# Patient Record
Sex: FEMALE | Race: WHITE | NOT HISPANIC OR LATINO | Employment: OTHER | URBAN - METROPOLITAN AREA
[De-identification: names, ages, dates, MRNs, and addresses within clinical notes are randomized per-mention and may not be internally consistent; named-entity substitution may affect disease eponyms.]

---

## 2023-04-07 DIAGNOSIS — D50.8 OTHER IRON DEFICIENCY ANEMIA: ICD-10-CM

## 2023-04-07 DIAGNOSIS — E03.8 OTHER SPECIFIED HYPOTHYROIDISM: Primary | ICD-10-CM

## 2023-04-07 LAB — THYROTROPIN (MIU/L) IN SER/PLAS BY DETECTION LIMIT <= 0.05 MIU/L: 9.47 MIU/L (ref 0.44–3.98)

## 2023-04-08 RX ORDER — LEVOTHYROXINE SODIUM 75 UG/1
75 TABLET ORAL DAILY
Qty: 90 TABLET | Refills: 1 | Status: SHIPPED | OUTPATIENT
Start: 2023-04-08 | End: 2023-06-15 | Stop reason: ALTCHOICE

## 2023-04-08 RX ORDER — LEVOTHYROXINE SODIUM 50 UG/1
50 TABLET ORAL
COMMUNITY
Start: 2021-05-08 | End: 2023-04-08

## 2023-04-08 NOTE — PROGRESS NOTES
TSH continues to rise, almost 10  Does adjust medication to 75mcg daily   Current daily dose ~65mcg daily   Repeat labs in 6-8 weeks, along with CBC and iron studies at that time  Results and plan communicated to patient     Avery Hare MD

## 2023-05-12 DIAGNOSIS — F32.A DEPRESSION, UNSPECIFIED DEPRESSION TYPE: Primary | ICD-10-CM

## 2023-06-15 ENCOUNTER — OFFICE VISIT (OUTPATIENT)
Dept: PRIMARY CARE | Facility: CLINIC | Age: 83
End: 2023-06-15
Payer: MEDICARE

## 2023-06-15 VITALS
OXYGEN SATURATION: 95 % | DIASTOLIC BLOOD PRESSURE: 59 MMHG | BODY MASS INDEX: 29.81 KG/M2 | WEIGHT: 162 LBS | SYSTOLIC BLOOD PRESSURE: 94 MMHG | HEART RATE: 65 BPM | HEIGHT: 62 IN | RESPIRATION RATE: 16 BRPM

## 2023-06-15 DIAGNOSIS — M54.50 MIDLINE LOW BACK PAIN, UNSPECIFIED CHRONICITY, UNSPECIFIED WHETHER SCIATICA PRESENT: Primary | ICD-10-CM

## 2023-06-15 DIAGNOSIS — D50.9 IRON DEFICIENCY ANEMIA, UNSPECIFIED IRON DEFICIENCY ANEMIA TYPE: ICD-10-CM

## 2023-06-15 DIAGNOSIS — E03.8 OTHER SPECIFIED HYPOTHYROIDISM: ICD-10-CM

## 2023-06-15 PROCEDURE — 1157F ADVNC CARE PLAN IN RCRD: CPT | Performed by: STUDENT IN AN ORGANIZED HEALTH CARE EDUCATION/TRAINING PROGRAM

## 2023-06-15 PROCEDURE — 99214 OFFICE O/P EST MOD 30 MIN: CPT | Performed by: STUDENT IN AN ORGANIZED HEALTH CARE EDUCATION/TRAINING PROGRAM

## 2023-06-15 PROCEDURE — 1036F TOBACCO NON-USER: CPT | Performed by: STUDENT IN AN ORGANIZED HEALTH CARE EDUCATION/TRAINING PROGRAM

## 2023-06-15 PROCEDURE — 1159F MED LIST DOCD IN RCRD: CPT | Performed by: STUDENT IN AN ORGANIZED HEALTH CARE EDUCATION/TRAINING PROGRAM

## 2023-06-15 PROCEDURE — 1160F RVW MEDS BY RX/DR IN RCRD: CPT | Performed by: STUDENT IN AN ORGANIZED HEALTH CARE EDUCATION/TRAINING PROGRAM

## 2023-06-15 RX ORDER — CLOBETASOL PROPIONATE 0.5 MG/G
OINTMENT TOPICAL
COMMUNITY
Start: 2022-09-13 | End: 2023-10-20 | Stop reason: SDUPTHER

## 2023-06-15 RX ORDER — ACETAMINOPHEN 500 MG
TABLET ORAL
COMMUNITY
Start: 2015-04-06

## 2023-06-15 RX ORDER — TIZANIDINE 2 MG/1
2 TABLET ORAL EVERY 8 HOURS PRN
Qty: 30 TABLET | Refills: 0 | Status: SHIPPED | OUTPATIENT
Start: 2023-06-15 | End: 2023-07-14 | Stop reason: SDUPTHER

## 2023-06-15 RX ORDER — PAROXETINE HYDROCHLORIDE 20 MG/1
TABLET, FILM COATED ORAL
COMMUNITY
Start: 2018-12-03 | End: 2023-08-21 | Stop reason: SDUPTHER

## 2023-06-15 RX ORDER — CARBOXYMETHYLCELLULOSE SODIUM 5 MG/ML
SOLUTION/ DROPS OPHTHALMIC
COMMUNITY

## 2023-06-15 RX ORDER — CALCIUM CARBONATE 600 MG
1 TABLET ORAL DAILY
COMMUNITY

## 2023-06-15 RX ORDER — ACETAMINOPHEN 500 MG/1
1 CAPSULE, LIQUID FILLED ORAL 2 TIMES DAILY
COMMUNITY

## 2023-06-15 RX ORDER — LEVOTHYROXINE SODIUM 100 UG/1
100 TABLET ORAL
COMMUNITY
End: 2023-06-17 | Stop reason: ENTERED-IN-ERROR

## 2023-06-15 RX ORDER — FERROUS SULFATE 325(65) MG
TABLET ORAL
COMMUNITY
Start: 2022-11-04

## 2023-06-15 RX ORDER — METOPROLOL SUCCINATE 25 MG/1
TABLET, EXTENDED RELEASE ORAL
COMMUNITY
Start: 2018-11-07 | End: 2024-01-02

## 2023-06-15 RX ORDER — IPRATROPIUM BROMIDE 21 UG/1
SPRAY, METERED NASAL
COMMUNITY
End: 2024-03-19 | Stop reason: SDUPTHER

## 2023-06-15 RX ORDER — METOPROLOL SUCCINATE 50 MG/1
50 TABLET, EXTENDED RELEASE ORAL DAILY
COMMUNITY
Start: 2019-11-04

## 2023-06-15 RX ORDER — ATORVASTATIN CALCIUM 40 MG/1
1 TABLET, FILM COATED ORAL
COMMUNITY
Start: 2016-04-18 | End: 2024-01-02

## 2023-06-15 NOTE — PROGRESS NOTES
Brittany Suarez is a 82 y.o. female seen in Clinic at [facility] [place] by Dr. Avery Hare on 06/15/23 for routine care, as well as for management of the following chronic medical conditions: DLD, Tachy-Alan s/p PPM, HypoT. She presents today with acute concern of low back pain for last 4-6 weeks. No fall or injury, describes as sharp pain, lateral and not midline. No weakness or changes in bowel/bladder habits. Some relief with OTC pain medication. Discussed low dose muscle relaxant for additional benefit at this time. Prior cancer history, doing well, follows with pulmonary (Dr. Jimenez). Out of abundance of precaution and given duration of symptoms, plain films of L-spine to assess for any suspicious bony lesions, compression fracture, etc.     #Low Back Pain   - likely sciatic per description   - supportive care, trial low dose muscle relaxant  - plain films given lung adeno history  - continue to follow closely     CHRONIC MEDICAL CONDITIONS:  #DLD  - Atorva 40  - LDL 90 per labs 07/2022    #Tachy-Alan Syndrome s/p pacemaker, PAFib  - AC   - Metoprolol 50+25mg   - Xarelto 20mg QHS     #HypoT  - Levothyroxine 75mcg dosing currently   - last TSH 9.47 in 04/2023 on 65mcg daily dosing; adjusted to 75  [  ] repeat TSH today     #Depression   - Paroxetine 20mg daily     #HTN  - BP historically on lower side  - Metoprolol only medication as above (HR 65 in office today)    #Lung Cancer  - Lung adeno; diagnosed 07/2018 s/p LLL lobectomy in 09/2018  - Follows with Pulm, Dr. Jimenez; last imaging in January 2023 reassuring; follows annually  - Prior smoker     #Anemia  - Iron supplementation 4x/week  - Repeat CBC and iron studies today with improved indices     #Osteoporosis   - patient defers treatment at this time  - continue to f/u; consider Rheum referral as she is refusing bisphosphonate  - Vitamin D and Calcium supplementation recommended     Past Medical History: as above   Past Medical History:   Diagnosis Date    Acute  upper respiratory infection, unspecified 11/01/2016    Viral upper respiratory infection    Bitten or stung by nonvenomous insect and other nonvenomous arthropods, initial encounter 12/01/2014    Nonvenomous insect bite of multiple sites    Disease of stomach and duodenum, unspecified 09/21/2013    Gastric and duodenal disease    Encounter for gynecological examination (general) (routine) without abnormal findings 11/17/2017    Visit for routine gyn exam    Encounter for screening for malignant neoplasm of rectum     Screening for cancer of the rectum    Headache, unspecified 10/31/2016    Sinus headache    Low back pain, unspecified 03/03/2016    Acute low back pain    Other conditions influencing health status 02/18/2017    History of cough    Other specified noninflammatory disorders of vagina 11/17/2017    Vaginal irritation    Other specified symptoms and signs involving the circulatory and respiratory systems 02/02/2017    Chest congestion    Personal history of other diseases of the musculoskeletal system and connective tissue 11/13/2020    History of osteoporosis    Personal history of other diseases of the respiratory system 02/02/2017    History of upper respiratory infection    Personal history of other diseases of the respiratory system 04/03/2017    History of acute sinusitis    Personal history of other medical treatment 11/17/2017    History of screening mammography    Urinary tract infection, site not specified 12/01/2014    Acute UTI     Subspecialty Medical Care: Pulm, Cardiology (as needed)    Past Surgical History:  lung lobectomy; PPM  Past Surgical History:   Procedure Laterality Date    ANKLE SURGERY  02/17/2014    Ankle Surgery    APPENDECTOMY  06/18/2014    Appendectomy    CARDIAC PACEMAKER PLACEMENT  06/22/2015    Pacemaker Placement    CT GUIDED PERCUTANEOUS BIOPSY LUNG  8/13/2018    CT GUIDED PERCUTANEOUS BIOPSY LUNG 8/13/2018 [facility] AIB LEGACY    FOOT SURGERY  06/22/2015    Foot Repair     LUNG LOBECTOMY  12/11/2018    Lung Lobectomy    MOUTH SURGERY  06/22/2015    Oral Surgery Tooth Extraction    TONSILLECTOMY  06/22/2015    Tonsillectomy     Medications:  Current Outpatient Medications:     atorvastatin (Lipitor) 40 mg tablet, Take 1 tablet (40 mg) by mouth once daily., Disp: , Rfl:     cholecalciferol (Vitamin D-3) 50 mcg (2,000 unit) capsule, Take by mouth., Disp: , Rfl:     clobetasol (Temovate) 0.05 % ointment, Apply a small amount to the vulva and perineum once daily, Disp: , Rfl:     ferrous sulfate 325 (65 Fe) MG tablet, Take by mouth., Disp: , Rfl:     metoprolol succinate XL (Toprol-XL) 25 mg 24 hr tablet, , Disp: , Rfl:     metoprolol succinate XL (Toprol-XL) 50 mg 24 hr tablet, , Disp: , Rfl:     PARoxetine (Paxil) 20 mg tablet, , Disp: , Rfl:     rivaroxaban (Xarelto) 20 mg tablet, Take by mouth., Disp: , Rfl:     acetaminophen 500 mg capsule, Take 1 capsule (500 mg) by mouth twice a day., Disp: , Rfl:     calcium carbonate 600 mg calcium (1,500 mg) tablet, Take 1 tablet (600 mg) by mouth once daily., Disp: , Rfl:     carboxymethylcellulose (Refresh Plus) 0.5 % ophthalmic solution, Administer into affected eye(s)., Disp: , Rfl:     ipratropium (Atrovent) 21 mcg (0.03 %) nasal spray, Administer into affected nostril(s)., Disp: , Rfl:     levothyroxine (Synthroid, Levoxyl) 75 mcg tablet, Take 1 tablet (75 mcg) by mouth once daily in the morning. Take before meals., Disp: , Rfl:     tiZANidine (Zanaflex) 2 mg tablet, Take 1 tablet (2 mg) by mouth every 8 hours if needed for muscle spasms for up to 10 days., Disp: 30 tablet, Rfl: 0  Pharmacy: [facility]; [facility]; [phone]    Allergies:   Allergies   Allergen Reactions    Amiodarone Nausea Only     Immunizations: staying up to date with COVID booster recommended     Family History:   - Brother passed away from PE, CLL   - Sister with Leiomyosarcoma (s/p hysterectomy); sister with asthma   - Mother with AML (age 40)  - Father  "with CVA (age 57)  - Son with GERD; VATS/Decortication s/p PNA   No family history on file.    Social History:   Home/Living Situation/Falls/Safety Assessment: lives at home alone; feels safe at home   Education/Employment/Work/Vocational: retired from    Activities: volunteering prior to the pandemic; avid reader; walking   Drug Use: prior smoker (50 years; up to pack a day); quit 10 years ago   Diet: no dietary concerns   Depression/Anxiety: on SSRI   Sexuality/Contraception/Menstrual History:   Sleep: deep sleeper; feels she sleeps too much     Patient Information:  Health Insurance: has insurance   Transportation: [employer] POA/Guardian: Sister (Connie) [phone]  Contact Information: [phone]    Visit Vitals  BP 94/59 (BP Location: Left arm, Patient Position: Sitting)   Pulse 65   Resp 16   Ht 1.575 m (5' 2\")   Wt 73.5 kg (162 lb)   SpO2 95%   BMI 29.63 kg/m²   Smoking Status Former   BSA 1.79 m²      PHYSICAL EXAM:   General:  female in NAD, well appearing   HEENT: NCAT, MMM  CV: RRR in office today   PULM: CTAB with diminished sounds at L base in setting of prior LLL lobectomy   ABD: soft, NT, ND, no rebound/guarding  : no suprapubic or CVA tenderness  EXT: WWP, no edema  SKIN: no rashes noted  NEURO: A&Ox3, no gross motor or sensory deficits, ambulates without assistance; gait not significantly impacted by lower back pain; mild TTP of L spine in paraspinal region with some radiation of pain elicited  PSYCH: pleasant mood, appropriate affect     Assessment/Plan    Brittany Suarez is a 82 y.o. female seen in Clinic at [facility] [place] by Dr. Avery Hare on 06/15/23 for routine care, as well as for management of the following chronic medical conditions: DLD, Tachy-Alan s/p PPM, HypoT. She presents today with acute concern of low back pain for last 4-6 weeks. No fall or injury, describes as sharp pain, lateral and not midline. No weakness or changes in bowel/bladder habits. Some relief with " OTC pain medication. Discussed low dose muscle relaxant for additional benefit at this time. Prior cancer history, doing well, follows with pulmonary (Dr. Jimenez). Out of abundance of precaution and given duration of symptoms, plain films of L-spine to assess for any suspicious bony lesions, compression fracture, etc.     #Low Back Pain   - likely sciatic per description   - supportive care, trial low dose muscle relaxant  - plain films given lung adeno history  - continue to follow closely     CHRONIC MEDICAL CONDITIONS:  #DLD  - Atorva 40  - LDL 90 per labs 07/2022    #Tachy-Alan Syndrome s/p pacemaker, PAFib  - AC   - Metoprolol 50+25mg   - Xarelto 20mg QHS     #HypoT  - Levothyroxine 75mcg dosing currently   - last TSH 9.47 in 04/2023 on 65mcg daily dosing; adjusted to 75  [  ] repeat TSH today     #Depression   - Paroxetine 20mg daily     #HTN  - BP historically on lower side  - Metoprolol only medication as above (HR 65 in office today)    #Lung Cancer  - Lung adeno; diagnosed 07/2018 s/p LLL lobectomy in 09/2018  - Follows with Pulm, Dr. Jimenez; last imaging in January 2023 reassuring; follows annually  - Prior smoker     #Anemia  - Iron supplementation 4x/week  - Repeat CBC and iron studies today with improved indices     #Osteoporosis   - patient defers treatment at this time  - continue to f/u; consider Rheum referral as she is refusing bisphosphonate  - Vitamin D and Calcium supplementation recommended     #Health Maintenance    Cancer Screening  - Cervical Cancer Screening: last pap smear/HPV testing: further screening not recommended given age    - Mammography: July 2023 due, ordered today   - Colorectal Cancer Screening: defers; last 2020   - Lung Cancer: prior history, follows with pulm     Laboratory Screening  - Lipid Screen: on statin, last labs 07/2022  - A1C, glucose screen: non-obese; normal labs 07/2022  - STI, HIV, Hep B screen: defers  - Hep C screen: defers    Imaging Screening  -  Osteoporosis/DEXA screening: Jan 2023, patient defers treatment at this time     Immunizations:   - Influenza: 2022   - COVID: bivalent booster UTD   - Tdap: due 2027  - Prevnar, Pneumovax: UTD  - Shingrix: UTD     Other Screening  - Health Literacy Assessment: Appropriate   - Depression screen: NEGATIVE   - Home safety/partner violence screen: NEGATIVE   - Alcohol/tobacco/drug use screen: prior smoker   - Healthcare POA/Advanced Directives: Sister     Referrals: plain films, repeat labs     Patient Discussion:    Please call back the office with any questions at [phone]. In the case of an emergency, please call 911 or go to the nearest Emergency Department.     RTC in 5 months for MCW visit (due November 2023), sooner if acute issues arise.     Avery Hare MD  Internal Medicine-Pediatrics  [facility] [address], [address]  P: [phone], F: [phone]

## 2023-06-16 ENCOUNTER — LAB (OUTPATIENT)
Dept: LAB | Facility: LAB | Age: 83
End: 2023-06-16
Payer: MEDICARE

## 2023-06-16 DIAGNOSIS — E03.8 OTHER SPECIFIED HYPOTHYROIDISM: ICD-10-CM

## 2023-06-16 DIAGNOSIS — D50.8 OTHER IRON DEFICIENCY ANEMIA: ICD-10-CM

## 2023-06-16 LAB
BASOPHILS (10*3/UL) IN BLOOD BY AUTOMATED COUNT: 0.05 X10E9/L (ref 0–0.1)
BASOPHILS/100 LEUKOCYTES IN BLOOD BY AUTOMATED COUNT: 0.9 % (ref 0–2)
EOSINOPHILS (10*3/UL) IN BLOOD BY AUTOMATED COUNT: 0.08 X10E9/L (ref 0–0.4)
EOSINOPHILS/100 LEUKOCYTES IN BLOOD BY AUTOMATED COUNT: 1.5 % (ref 0–6)
ERYTHROCYTE DISTRIBUTION WIDTH (RATIO) BY AUTOMATED COUNT: 15 % (ref 11.5–14.5)
ERYTHROCYTE MEAN CORPUSCULAR HEMOGLOBIN CONCENTRATION (G/DL) BY AUTOMATED: 30.6 G/DL (ref 32–36)
ERYTHROCYTE MEAN CORPUSCULAR VOLUME (FL) BY AUTOMATED COUNT: 75 FL (ref 80–100)
ERYTHROCYTES (10*6/UL) IN BLOOD BY AUTOMATED COUNT: 5.4 X10E12/L (ref 4–5.2)
FERRITIN (UG/LL) IN SER/PLAS: 94 UG/L (ref 8–150)
HEMATOCRIT (%) IN BLOOD BY AUTOMATED COUNT: 40.5 % (ref 36–46)
HEMOGLOBIN (G/DL) IN BLOOD: 12.4 G/DL (ref 12–16)
IMMATURE GRANULOCYTES/100 LEUKOCYTES IN BLOOD BY AUTOMATED COUNT: 0.2 % (ref 0–0.9)
IRON (UG/DL) IN SER/PLAS: 110 UG/DL (ref 35–150)
IRON BINDING CAPACITY (UG/DL) IN SER/PLAS: 393 UG/DL (ref 240–445)
IRON SATURATION (%) IN SER/PLAS: 28 % (ref 25–45)
LEUKOCYTES (10*3/UL) IN BLOOD BY AUTOMATED COUNT: 5.3 X10E9/L (ref 4.4–11.3)
LYMPHOCYTES (10*3/UL) IN BLOOD BY AUTOMATED COUNT: 2.2 X10E9/L (ref 0.8–3)
LYMPHOCYTES/100 LEUKOCYTES IN BLOOD BY AUTOMATED COUNT: 41.7 % (ref 13–44)
MONOCYTES (10*3/UL) IN BLOOD BY AUTOMATED COUNT: 0.48 X10E9/L (ref 0.05–0.8)
MONOCYTES/100 LEUKOCYTES IN BLOOD BY AUTOMATED COUNT: 9.1 % (ref 2–10)
NEUTROPHILS (10*3/UL) IN BLOOD BY AUTOMATED COUNT: 2.45 X10E9/L (ref 1.6–5.5)
NEUTROPHILS/100 LEUKOCYTES IN BLOOD BY AUTOMATED COUNT: 46.6 % (ref 40–80)
NRBC (PER 100 WBCS) BY AUTOMATED COUNT: 0 /100 WBC (ref 0–0)
PLATELETS (10*3/UL) IN BLOOD AUTOMATED COUNT: 264 X10E9/L (ref 150–450)
THYROTROPIN (MIU/L) IN SER/PLAS BY DETECTION LIMIT <= 0.05 MIU/L: 4.58 MIU/L (ref 0.44–3.98)
THYROXINE (T4) FREE (NG/DL) IN SER/PLAS: 0.99 NG/DL (ref 0.78–1.48)

## 2023-06-16 PROCEDURE — 82728 ASSAY OF FERRITIN: CPT

## 2023-06-16 PROCEDURE — 84443 ASSAY THYROID STIM HORMONE: CPT

## 2023-06-16 PROCEDURE — 84439 ASSAY OF FREE THYROXINE: CPT

## 2023-06-16 PROCEDURE — 83550 IRON BINDING TEST: CPT

## 2023-06-16 PROCEDURE — 83540 ASSAY OF IRON: CPT

## 2023-06-16 PROCEDURE — 85025 COMPLETE CBC W/AUTO DIFF WBC: CPT

## 2023-06-16 PROCEDURE — 36415 COLL VENOUS BLD VENIPUNCTURE: CPT

## 2023-06-17 RX ORDER — LEVOTHYROXINE SODIUM 75 UG/1
75 TABLET ORAL
COMMUNITY
End: 2023-06-28 | Stop reason: SDUPTHER

## 2023-06-27 ENCOUNTER — TELEPHONE (OUTPATIENT)
Dept: PRIMARY CARE | Facility: CLINIC | Age: 83
End: 2023-06-27
Payer: MEDICARE

## 2023-06-28 DIAGNOSIS — E03.8 OTHER SPECIFIED HYPOTHYROIDISM: Primary | ICD-10-CM

## 2023-06-28 RX ORDER — LEVOTHYROXINE SODIUM 75 UG/1
75 TABLET ORAL
Qty: 90 TABLET | Refills: 3 | Status: SHIPPED | OUTPATIENT
Start: 2023-06-28 | End: 2024-04-19

## 2023-06-30 ENCOUNTER — LAB (OUTPATIENT)
Dept: LAB | Facility: LAB | Age: 83
End: 2023-06-30
Payer: MEDICARE

## 2023-06-30 ENCOUNTER — OFFICE VISIT (OUTPATIENT)
Dept: PRIMARY CARE | Facility: CLINIC | Age: 83
End: 2023-06-30
Payer: MEDICARE

## 2023-06-30 VITALS
WEIGHT: 157 LBS | RESPIRATION RATE: 17 BRPM | OXYGEN SATURATION: 94 % | SYSTOLIC BLOOD PRESSURE: 103 MMHG | HEIGHT: 62 IN | DIASTOLIC BLOOD PRESSURE: 53 MMHG | HEART RATE: 69 BPM | BODY MASS INDEX: 28.89 KG/M2

## 2023-06-30 DIAGNOSIS — M54.50 BILATERAL LOW BACK PAIN, UNSPECIFIED CHRONICITY, UNSPECIFIED WHETHER SCIATICA PRESENT: ICD-10-CM

## 2023-06-30 DIAGNOSIS — M54.50 BILATERAL LOW BACK PAIN, UNSPECIFIED CHRONICITY, UNSPECIFIED WHETHER SCIATICA PRESENT: Primary | ICD-10-CM

## 2023-06-30 LAB
C REACTIVE PROTEIN (MG/L) IN SER/PLAS: <0.1 MG/DL
SEDIMENTATION RATE, ERYTHROCYTE: 5 MM/H (ref 0–30)

## 2023-06-30 PROCEDURE — 85652 RBC SED RATE AUTOMATED: CPT

## 2023-06-30 PROCEDURE — 1157F ADVNC CARE PLAN IN RCRD: CPT | Performed by: STUDENT IN AN ORGANIZED HEALTH CARE EDUCATION/TRAINING PROGRAM

## 2023-06-30 PROCEDURE — 36415 COLL VENOUS BLD VENIPUNCTURE: CPT

## 2023-06-30 PROCEDURE — 1160F RVW MEDS BY RX/DR IN RCRD: CPT | Performed by: STUDENT IN AN ORGANIZED HEALTH CARE EDUCATION/TRAINING PROGRAM

## 2023-06-30 PROCEDURE — 1036F TOBACCO NON-USER: CPT | Performed by: STUDENT IN AN ORGANIZED HEALTH CARE EDUCATION/TRAINING PROGRAM

## 2023-06-30 PROCEDURE — 1159F MED LIST DOCD IN RCRD: CPT | Performed by: STUDENT IN AN ORGANIZED HEALTH CARE EDUCATION/TRAINING PROGRAM

## 2023-06-30 PROCEDURE — 99213 OFFICE O/P EST LOW 20 MIN: CPT | Performed by: STUDENT IN AN ORGANIZED HEALTH CARE EDUCATION/TRAINING PROGRAM

## 2023-06-30 PROCEDURE — 86140 C-REACTIVE PROTEIN: CPT

## 2023-06-30 RX ORDER — METHYLPREDNISOLONE 4 MG/1
TABLET ORAL
Qty: 21 TABLET | Refills: 0 | Status: SHIPPED | OUTPATIENT
Start: 2023-06-30 | End: 2023-07-07

## 2023-06-30 RX ORDER — METHYLPREDNISOLONE 4 MG/1
TABLET ORAL
Qty: 21 TABLET | Refills: 0 | Status: SHIPPED | OUTPATIENT
Start: 2023-06-30 | End: 2023-06-30 | Stop reason: SDUPTHER

## 2023-06-30 NOTE — PROGRESS NOTES
Brittany Suarez is a 82 y.o. female seen in Clinic at [facility] [place] by Dr. Avery Hare on 06/30/23 for routine care, as well as for management of the following chronic medical conditions: DLD, Tachy-Alan s/p PPM, HypoT. She presents today with acute concern of low back pain for last 6-8 weeks. No fall or injury. Recently seen, plain films done and reassuring without evidence of compression fracture. Some relief with muscle relaxer but persistent. Some neck/shoulder pain as well. No jaw claudication, visual symptoms, or headache. Differential diagnosis multifocal MSK pain vs. Inflammatory. Given duration of symptoms and distribution, will obtain inflammatory markers to assess for possible PMR. If normal, treat symptomatically with steroid burst and PT. If positive/elevated, further workup and treatment as clinically indicated.     #Low Back Pain   [  ] inflammatory markers  [  ] steroid burst  - supportive care, continue low dose muscle relaxant  - plain films given lung adeno history: reassuring   [  ] PT referral   - continue to follow closely     CHRONIC MEDICAL CONDITIONS:  #DLD  - Atorva 40  - LDL 90 per labs 07/2022    #Tachy-Alan Syndrome s/p pacemaker, PAFib  - AC   - Metoprolol 50+25mg   - Xarelto 20mg QHS     #HypoT  - Levothyroxine 75mcg dosing currently   - last TSH 4.5 in 06/2023     #Depression   - Paroxetine 20mg daily     #HTN  - BP historically on lower side  - Metoprolol only medication as above (HR 69 in office today)    #Lung Cancer  - Lung adeno; diagnosed 07/2018 s/p LLL lobectomy in 09/2018  - Follows with Pulm, Dr. Jimenez; last imaging in January 2023 reassuring; follows annually  - Prior smoker     #Anemia  - Iron supplementation 4x/week  - Repeat CBC and iron studies from June 2023 with improved indices     #Osteoporosis   - patient defers treatment at this time  - continue to f/u; consider Rheum referral in past as she is refusing bisphosphonate  - Vitamin D and Calcium supplementation  recommended     Past Medical History: as above   Past Medical History:   Diagnosis Date    Acute upper respiratory infection, unspecified 11/01/2016    Viral upper respiratory infection    Bitten or stung by nonvenomous insect and other nonvenomous arthropods, initial encounter 12/01/2014    Nonvenomous insect bite of multiple sites    Disease of stomach and duodenum, unspecified 09/21/2013    Gastric and duodenal disease    Encounter for gynecological examination (general) (routine) without abnormal findings 11/17/2017    Visit for routine gyn exam    Encounter for screening for malignant neoplasm of rectum     Screening for cancer of the rectum    Headache, unspecified 10/31/2016    Sinus headache    Low back pain, unspecified 03/03/2016    Acute low back pain    Other conditions influencing health status 02/18/2017    History of cough    Other specified noninflammatory disorders of vagina 11/17/2017    Vaginal irritation    Other specified symptoms and signs involving the circulatory and respiratory systems 02/02/2017    Chest congestion    Personal history of other diseases of the musculoskeletal system and connective tissue 11/13/2020    History of osteoporosis    Personal history of other diseases of the respiratory system 02/02/2017    History of upper respiratory infection    Personal history of other diseases of the respiratory system 04/03/2017    History of acute sinusitis    Personal history of other medical treatment 11/17/2017    History of screening mammography    Urinary tract infection, site not specified 12/01/2014    Acute UTI     Subspecialty Medical Care: Pulm, Cardiology (as needed)    Past Surgical History:  lung lobectomy; PPM  Past Surgical History:   Procedure Laterality Date    ANKLE SURGERY  02/17/2014    Ankle Surgery    APPENDECTOMY  06/18/2014    Appendectomy    CARDIAC PACEMAKER PLACEMENT  06/22/2015    Pacemaker Placement    CT GUIDED PERCUTANEOUS BIOPSY LUNG  8/13/2018    CT GUIDED  PERCUTANEOUS BIOPSY LUNG [place] [facility]    FOOT SURGERY  06/22/2015    Foot Repair    LUNG LOBECTOMY  12/11/2018    Lung Lobectomy    MOUTH SURGERY  06/22/2015    Oral Surgery Tooth Extraction    TONSILLECTOMY  06/22/2015    Tonsillectomy     Medications:  Current Outpatient Medications:     acetaminophen 500 mg capsule, Take 1 capsule (500 mg) by mouth twice a day., Disp: , Rfl:     atorvastatin (Lipitor) 40 mg tablet, Take 1 tablet (40 mg) by mouth once daily., Disp: , Rfl:     calcium carbonate 600 mg calcium (1,500 mg) tablet, Take 1 tablet (600 mg) by mouth once daily., Disp: , Rfl:     carboxymethylcellulose (Refresh Plus) 0.5 % ophthalmic solution, Administer into affected eye(s)., Disp: , Rfl:     cholecalciferol (Vitamin D-3) 50 mcg (2,000 unit) capsule, Take by mouth., Disp: , Rfl:     clobetasol (Temovate) 0.05 % ointment, Apply a small amount to the vulva and perineum once daily, Disp: , Rfl:     ferrous sulfate 325 (65 Fe) MG tablet, Take by mouth., Disp: , Rfl:     ipratropium (Atrovent) 21 mcg (0.03 %) nasal spray, Administer into affected nostril(s)., Disp: , Rfl:     levothyroxine (Synthroid, Levoxyl) 75 mcg tablet, Take 1 tablet (75 mcg) by mouth once daily in the morning. Take before meals., Disp: 90 tablet, Rfl: 3    methylPREDNISolone (Medrol Dospak) 4 mg tablets, Take as directed on package., Disp: 21 tablet, Rfl: 0    metoprolol succinate XL (Toprol-XL) 25 mg 24 hr tablet, , Disp: , Rfl:     metoprolol succinate XL (Toprol-XL) 50 mg 24 hr tablet, , Disp: , Rfl:     PARoxetine (Paxil) 20 mg tablet, , Disp: , Rfl:     rivaroxaban (Xarelto) 20 mg tablet, Take by mouth., Disp: , Rfl:     tiZANidine (Zanaflex) 2 mg tablet, Take 1 tablet (2 mg) by mouth every 8 hours if needed for muscle spasms for up to 10 days., Disp: 30 tablet, Rfl: 0  Pharmacy: [facility]; [facility]; [phone]    Allergies:   Allergies   Allergen Reactions    Amiodarone Nausea Only     Immunizations: staying  "up to date with COVID booster recommended     Family History:   - Brother passed away from PE, CLL   - Sister with Leiomyosarcoma (s/p hysterectomy); sister with asthma   - Mother with AML (age 40)  - Father with CVA (age 57)  - Son with GERD; VATS/Decortication s/p PNA   No family history on file.    Social History:   Home/Living Situation/Falls/Safety Assessment: lives at home alone; feels safe at home   Education/Employment/Work/Vocational: retired from    Activities: volunteering prior to the pandemic; avid reader; walking   Drug Use: prior smoker (50 years; up to pack a day); quit 10 years ago   Diet: no dietary concerns   Depression/Anxiety: on SSRI   Sexuality/Contraception/Menstrual History:   Sleep: deep sleeper; feels she sleeps too much     Patient Information:  Health Insurance: has insurance   Transportation: [employer] POA/Guardian: Sister Michael) [phone]  Contact Information: [phone]    Visit Vitals  /53   Pulse 69   Resp 17   Ht 1.575 m (5' 2\")   Wt 71.2 kg (157 lb)   SpO2 94%   BMI 28.72 kg/m²   Smoking Status Former   BSA 1.76 m²      PHYSICAL EXAM:   General:  female in NAD, well appearing   HEENT: NCAT, MMM  CV: RRR in office today   PULM: CTAB with diminished sounds at L base in setting of prior LLL lobectomy   ABD: soft, NT, ND, no rebound/guarding  : no suprapubic or CVA tenderness  EXT: WWP, no edema  SKIN: no rashes noted  NEURO: A&Ox3, no gross motor or sensory deficits, ambulates without assistance; gait not significantly impacted by lower back pain  PSYCH: pleasant mood, appropriate affect     Assessment/Plan    Brittany Suarez is a 82 y.o. female seen in Clinic at [facility] [place] by Dr. Avery Hare on 06/30/23 for routine care, as well as for management of the following chronic medical conditions: DLD, Tachy-Alan s/p PPM, HypoT. She presents today with acute concern of low back pain for last 6-8 weeks. No fall or injury. Recently seen, plain films done and " reassuring without evidence of compression fracture. Some relief with muscle relaxer but persistent. Some neck/shoulder pain as well. No jaw claudication, visual symptoms, or headache. Differential diagnosis multifocal MSK pain vs. Inflammatory. Given duration of symptoms and distribution, will obtain inflammatory markers to assess for possible PMR. If normal, treat symptomatically with steroid burst and PT. If positive/elevated, further workup and treatment as clinically indicated.     #Low Back Pain   [  ] inflammatory markers  [  ] steroid burst  - supportive care, continue low dose muscle relaxant  - plain films given lung adeno history: reassuring   [  ] PT referral   - continue to follow closely     CHRONIC MEDICAL CONDITIONS:  #DLD  - Atorva 40  - LDL 90 per labs 07/2022    #Tachy-Alan Syndrome s/p pacemaker, PAFib  - AC   - Metoprolol 50+25mg   - Xarelto 20mg QHS     #HypoT  - Levothyroxine 75mcg dosing currently   - last TSH 4.5 in 06/2023     #Depression   - Paroxetine 20mg daily     #HTN  - BP historically on lower side  - Metoprolol only medication as above (HR 69 in office today)    #Lung Cancer  - Lung adeno; diagnosed 07/2018 s/p LLL lobectomy in 09/2018  - Follows with Pulm, Dr. Jimenez; last imaging in January 2023 reassuring; follows annually  - Prior smoker     #Anemia  - Iron supplementation 4x/week  - Repeat CBC and iron studies from June 2023 with improved indices     #Osteoporosis   - patient defers treatment at this time  - continue to f/u; consider Rheum referral in past as she is refusing bisphosphonate  - Vitamin D and Calcium supplementation recommended     #Health Maintenance    Cancer Screening  - Cervical Cancer Screening: last pap smear/HPV testing: further screening not recommended given age    - Mammography: July 2023 due and previously ordered  - Colorectal Cancer Screening: defers; last 2020   - Lung Cancer: prior history, follows with pulm     Laboratory Screening  - Lipid Screen:  on statin, last labs 07/2022  - A1C, glucose screen: non-obese; normal labs 07/2022  - STI, HIV, Hep B screen: defers  - Hep C screen: defers    Imaging Screening  - Osteoporosis/DEXA screening: Jan 2023, patient defers treatment at this time     Immunizations:   - Influenza: 2022   - COVID: bivalent booster UTD   - Tdap: due 2027  - Prevnar, Pneumovax: UTD  - Shingrix: UTD     Other Screening  - Health Literacy Assessment: Appropriate   - Depression screen: NEGATIVE   - Home safety/partner violence screen: NEGATIVE   - Alcohol/tobacco/drug use screen: prior smoker   - Healthcare POA/Advanced Directives: Sister     Referrals: ESR, CRP     Patient Discussion:    Please call back the office with any questions at [phone]. In the case of an emergency, please call 911 or go to the nearest Emergency Department.     RTC in 5 months for MCW visit (due November 2023), sooner if acute issues arise.     Avery Hare MD  Internal Medicine-Pediatrics  [facility] [place] [address], [address]  P: [phone], F: [phone]

## 2023-06-30 NOTE — PATIENT INSTRUCTIONS
Take steroid medication as directed:   Typically 6 tablets on day one, 5 tablets on day 2, 4 tablets on day 3, 3 tablets on day 4, 2 tablets on day 5, and 1 tablet on day 6 (21 tablets in pack total)    Labs today in [address] (downstairs)    Physical therapy referral if needed    Recent x-rays of back looked good.    You can continue to use the Tiaznidine (muscle relaxant) as well for additional help.    Best,  Dr. Hare

## 2023-07-06 ENCOUNTER — TELEPHONE (OUTPATIENT)
Dept: PRIMARY CARE | Facility: CLINIC | Age: 83
End: 2023-07-06
Payer: MEDICARE

## 2023-07-14 DIAGNOSIS — M54.50 MIDLINE LOW BACK PAIN, UNSPECIFIED CHRONICITY, UNSPECIFIED WHETHER SCIATICA PRESENT: ICD-10-CM

## 2023-07-14 RX ORDER — TIZANIDINE 2 MG/1
2 TABLET ORAL EVERY 8 HOURS PRN
Qty: 30 TABLET | Refills: 0 | Status: SHIPPED | OUTPATIENT
Start: 2023-07-14 | End: 2024-01-10 | Stop reason: WASHOUT

## 2023-08-21 DIAGNOSIS — F32.A DEPRESSION, UNSPECIFIED DEPRESSION TYPE: Primary | ICD-10-CM

## 2023-08-21 RX ORDER — PAROXETINE HYDROCHLORIDE 20 MG/1
TABLET, FILM COATED ORAL
Qty: 90 TABLET | Refills: 2 | Status: SHIPPED | OUTPATIENT
Start: 2023-08-21 | End: 2024-01-10 | Stop reason: SDUPTHER

## 2023-08-30 RX ORDER — PAROXETINE HYDROCHLORIDE 20 MG/1
TABLET, FILM COATED ORAL
Qty: 90 TABLET | Refills: 3 | Status: SHIPPED | OUTPATIENT
Start: 2023-08-30

## 2023-09-21 PROBLEM — I48.92 ATRIAL FIBRILLATION AND FLUTTER (MULTI): Status: ACTIVE | Noted: 2023-05-22

## 2023-09-21 PROBLEM — H16.229 KERATOCONJUNCTIVITIS SICCA: Status: ACTIVE | Noted: 2023-05-16

## 2023-09-21 PROBLEM — Z95.0 PRESENCE OF CARDIAC PACEMAKER: Status: ACTIVE | Noted: 2023-09-21

## 2023-09-21 PROBLEM — H52.13 MYOPIA OF BOTH EYES: Status: ACTIVE | Noted: 2023-05-16

## 2023-09-21 PROBLEM — M79.671 BILATERAL LEG AND FOOT PAIN: Status: ACTIVE | Noted: 2023-09-21

## 2023-09-21 PROBLEM — D22.9 BENIGN MOLE: Status: ACTIVE | Noted: 2023-09-21

## 2023-09-21 PROBLEM — E78.5 HYPERLIPIDEMIA: Status: ACTIVE | Noted: 2023-09-21

## 2023-09-21 PROBLEM — M43.10 ACQUIRED SPONDYLOLISTHESIS: Status: ACTIVE | Noted: 2023-09-21

## 2023-09-21 PROBLEM — R53.83 FATIGUE: Status: ACTIVE | Noted: 2023-09-21

## 2023-09-21 PROBLEM — H52.10 MYOPIA WITH ASTIGMATISM AND PRESBYOPIA: Status: ACTIVE | Noted: 2023-09-21

## 2023-09-21 PROBLEM — J34.89 RHINORRHEA: Status: ACTIVE | Noted: 2023-09-21

## 2023-09-21 PROBLEM — H52.4 MYOPIA WITH ASTIGMATISM AND PRESBYOPIA: Status: ACTIVE | Noted: 2023-09-21

## 2023-09-21 PROBLEM — N95.2 VAGINAL ATROPHY: Status: ACTIVE | Noted: 2023-09-21

## 2023-09-21 PROBLEM — Z79.01 ON RIVAROXABAN THERAPY: Status: ACTIVE | Noted: 2023-09-21

## 2023-09-21 PROBLEM — I49.5 TACHYCARDIA-BRADYCARDIA SYNDROME (MULTI): Status: ACTIVE | Noted: 2023-09-21

## 2023-09-21 PROBLEM — R94.4 DECREASED GFR: Status: ACTIVE | Noted: 2023-09-21

## 2023-09-21 PROBLEM — D64.9 ANEMIA OF UNKNOWN ETIOLOGY: Status: ACTIVE | Noted: 2023-09-21

## 2023-09-21 PROBLEM — H52.209 MYOPIA WITH ASTIGMATISM AND PRESBYOPIA: Status: ACTIVE | Noted: 2023-09-21

## 2023-09-21 PROBLEM — M79.605 BILATERAL LEG AND FOOT PAIN: Status: ACTIVE | Noted: 2023-09-21

## 2023-09-21 PROBLEM — Z98.890 STATUS POST BUNIONECTOMY: Status: ACTIVE | Noted: 2021-07-19

## 2023-09-21 PROBLEM — R22.2 CHEST WALL MASS: Status: ACTIVE | Noted: 2023-09-21

## 2023-09-21 PROBLEM — H43.811 POSTERIOR VITREOUS DETACHMENT OF RIGHT EYE: Status: ACTIVE | Noted: 2023-05-16

## 2023-09-21 PROBLEM — M79.604 BILATERAL LEG AND FOOT PAIN: Status: ACTIVE | Noted: 2023-09-21

## 2023-09-21 PROBLEM — D36.7 BENIGN NEOPLASM OF UPPER EXTREMITY: Status: ACTIVE | Noted: 2019-01-24

## 2023-09-21 PROBLEM — D48.5 NEOPLASM OF UNCERTAIN BEHAVIOR OF SKIN: Status: ACTIVE | Noted: 2019-01-24

## 2023-09-21 PROBLEM — M35.01 KERATOCONJUNCTIVITIS SICCA (MULTI): Status: ACTIVE | Noted: 2023-05-16

## 2023-09-21 PROBLEM — L82.1 OTHER SEBORRHEIC KERATOSIS: Status: ACTIVE | Noted: 2019-01-24

## 2023-09-21 PROBLEM — H00.012 HORDEOLUM EXTERNUM OF RIGHT LOWER EYELID: Status: ACTIVE | Noted: 2023-09-21

## 2023-09-21 PROBLEM — F43.0 STRESS REACTION: Status: ACTIVE | Noted: 2023-09-21

## 2023-09-21 PROBLEM — I10 BENIGN ESSENTIAL HYPERTENSION: Status: ACTIVE | Noted: 2023-09-21

## 2023-09-21 PROBLEM — M79.672 BILATERAL LEG AND FOOT PAIN: Status: ACTIVE | Noted: 2023-09-21

## 2023-09-21 PROBLEM — D36.7 BENIGN NEOPLASM OF LOWER EXTREMITY: Status: ACTIVE | Noted: 2019-01-24

## 2023-09-21 PROBLEM — J31.0 CHRONIC RHINITIS: Status: ACTIVE | Noted: 2023-09-21

## 2023-09-21 PROBLEM — C34.90 LUNG CANCER (MULTI): Status: ACTIVE | Noted: 2023-09-21

## 2023-09-21 PROBLEM — R41.89 SUBJECTIVE MEMORY COMPLAINTS: Status: ACTIVE | Noted: 2023-09-21

## 2023-09-21 PROBLEM — R91.8 LUNG INFILTRATE: Status: ACTIVE | Noted: 2023-09-21

## 2023-09-21 PROBLEM — R68.2 DRY MOUTH: Status: ACTIVE | Noted: 2023-09-21

## 2023-09-21 PROBLEM — D18.01 HEMANGIOMA OF SKIN AND SUBCUTANEOUS TISSUE: Status: ACTIVE | Noted: 2019-01-24

## 2023-09-21 PROBLEM — E03.9 HYPOTHYROIDISM: Status: ACTIVE | Noted: 2023-09-21

## 2023-09-21 PROBLEM — I48.0 PAROXYSMAL ATRIAL FIBRILLATION (MULTI): Status: ACTIVE | Noted: 2023-09-21

## 2023-09-21 PROBLEM — L85.3 XEROSIS OF SKIN: Status: ACTIVE | Noted: 2023-09-21

## 2023-09-21 PROBLEM — M54.31 SCIATICA OF RIGHT SIDE: Status: ACTIVE | Noted: 2023-09-21

## 2023-09-21 PROBLEM — D36.7 BENIGN NEOPLASM OF FACE: Status: ACTIVE | Noted: 2019-01-24

## 2023-09-21 PROBLEM — H52.4 PRESBYOPIA OF BOTH EYES: Status: ACTIVE | Noted: 2023-05-16

## 2023-09-21 PROBLEM — K13.0 CHEILOSIS: Status: ACTIVE | Noted: 2023-09-21

## 2023-09-21 PROBLEM — G25.81 RESTLESS LEGS SYNDROME: Status: ACTIVE | Noted: 2023-09-21

## 2023-09-21 PROBLEM — D36.7 BENIGN NEOPLASM OF TRUNK: Status: ACTIVE | Noted: 2019-01-24

## 2023-09-21 PROBLEM — H02.889 MEIBOMIAN GLAND DYSFUNCTION: Status: ACTIVE | Noted: 2023-05-16

## 2023-09-21 PROBLEM — I48.91 ATRIAL FIBRILLATION AND FLUTTER (MULTI): Status: ACTIVE | Noted: 2023-05-22

## 2023-09-21 PROBLEM — L81.4 LENTIGINES: Status: ACTIVE | Noted: 2019-01-24

## 2023-09-21 PROBLEM — N90.89 VULVAR LESION: Status: ACTIVE | Noted: 2023-09-21

## 2023-09-21 PROBLEM — H52.203 ASTIGMATISM OF BOTH EYES: Status: ACTIVE | Noted: 2023-05-16

## 2023-09-21 PROBLEM — I44.2 COMPLETE ATRIOVENTRICULAR BLOCK (MULTI): Status: ACTIVE | Noted: 2023-09-21

## 2023-09-21 PROBLEM — R42 LIGHTHEADEDNESS: Status: ACTIVE | Noted: 2023-09-21

## 2023-09-21 PROBLEM — H04.129 DRY EYE SYNDROME: Status: ACTIVE | Noted: 2023-05-16

## 2023-09-21 PROBLEM — L91.8 OTHER HYPERTROPHIC DISORDERS OF THE SKIN: Status: ACTIVE | Noted: 2019-01-24

## 2023-09-21 PROBLEM — M85.851 OSTEOPENIA OF BOTH HIPS: Status: ACTIVE | Noted: 2023-09-21

## 2023-09-21 PROBLEM — H25.12 AGE-RELATED NUCLEAR CATARACT OF LEFT EYE: Status: ACTIVE | Noted: 2023-09-21

## 2023-09-21 PROBLEM — Z90.49 HX OF APPENDECTOMY: Status: ACTIVE | Noted: 2021-07-19

## 2023-09-21 PROBLEM — L82.1 SEBORRHEIC KERATOSIS: Status: ACTIVE | Noted: 2019-01-24

## 2023-09-21 PROBLEM — Z90.89 HX OF TONSILLECTOMY: Status: ACTIVE | Noted: 2021-07-19

## 2023-09-21 PROBLEM — H25.11 AGE-RELATED NUCLEAR CATARACT OF RIGHT EYE: Status: ACTIVE | Noted: 2023-09-21

## 2023-09-21 PROBLEM — L91.8 SKIN TAG: Status: ACTIVE | Noted: 2019-01-24

## 2023-09-21 PROBLEM — E78.00 HYPERCHOLESTEROLEMIA: Status: ACTIVE | Noted: 2023-09-21

## 2023-09-21 PROBLEM — L29.9 PRURITUS: Status: ACTIVE | Noted: 2023-09-21

## 2023-09-21 PROBLEM — M85.852 OSTEOPENIA OF BOTH HIPS: Status: ACTIVE | Noted: 2023-09-21

## 2023-09-21 PROBLEM — F32.A DEPRESSION: Status: ACTIVE | Noted: 2023-09-21

## 2023-09-21 PROBLEM — L73.8 OTHER SPECIFIED FOLLICULAR DISORDERS: Status: ACTIVE | Noted: 2019-01-24

## 2023-09-21 PROBLEM — D18.00 ANGIOMA: Status: ACTIVE | Noted: 2019-01-24

## 2023-09-21 PROBLEM — R09.81 NASAL CONGESTION: Status: ACTIVE | Noted: 2023-09-21

## 2023-09-21 PROBLEM — K21.9 GERD WITHOUT ESOPHAGITIS: Status: ACTIVE | Noted: 2023-09-21

## 2023-09-21 PROBLEM — L81.4 OTHER MELANIN HYPERPIGMENTATION: Status: ACTIVE | Noted: 2019-01-24

## 2023-09-21 PROBLEM — H25.9 AGE-RELATED CATARACT OF BOTH EYES: Status: ACTIVE | Noted: 2023-05-16

## 2023-09-21 PROBLEM — L98.9 FACIAL LESION: Status: ACTIVE | Noted: 2023-09-21

## 2023-09-21 PROBLEM — E55.9 VITAMIN D DEFICIENCY: Status: ACTIVE | Noted: 2023-09-21

## 2023-09-21 PROBLEM — L72.3 SEBACEOUS CYST: Status: ACTIVE | Noted: 2019-01-24

## 2023-09-21 PROBLEM — M54.2 NECK PAIN: Status: ACTIVE | Noted: 2023-09-21

## 2023-09-21 PROBLEM — L90.0 LICHEN SCLEROSUS: Status: ACTIVE | Noted: 2023-09-21

## 2023-09-21 PROBLEM — J30.2 CHRONIC SEASONAL ALLERGIC RHINITIS: Status: ACTIVE | Noted: 2023-09-21

## 2023-09-21 PROBLEM — U07.1 COVID-19 VIRUS INFECTION: Status: ACTIVE | Noted: 2023-09-21

## 2023-09-21 PROBLEM — R93.89 ABNORMAL CHEST CT: Status: ACTIVE | Noted: 2023-09-21

## 2023-09-21 PROBLEM — R91.1 LUNG NODULE: Status: ACTIVE | Noted: 2023-09-21

## 2023-09-21 RX ORDER — MUPIROCIN 20 MG/G
OINTMENT TOPICAL 2 TIMES DAILY
COMMUNITY
End: 2024-01-10 | Stop reason: ALTCHOICE

## 2023-09-21 RX ORDER — OLOPATADINE HYDROCHLORIDE 1 MG/ML
1 SOLUTION/ DROPS OPHTHALMIC DAILY
COMMUNITY

## 2023-10-06 ENCOUNTER — TREATMENT (OUTPATIENT)
Dept: PHYSICAL THERAPY | Facility: CLINIC | Age: 83
End: 2023-10-06
Payer: MEDICARE

## 2023-10-06 DIAGNOSIS — M54.50 ACUTE MIDLINE LOW BACK PAIN WITHOUT SCIATICA: Primary | ICD-10-CM

## 2023-10-06 PROCEDURE — 97110 THERAPEUTIC EXERCISES: CPT | Mod: GP

## 2023-10-06 NOTE — PROGRESS NOTES
Physical Therapy Orthopedic Examination Note    Patient Name: Brittany Suarez  MRN Number: [id number]  Initial Examination Date: 8/9/23  Referring Provider: Dr. Hare  Onset: 6/30/23    Today's Date: 10/6/2023  Time Calculation  Start Time: 0300  Stop Time: 0340  Time Calculation (min): 40 min    Insurance:  Visit Number: 4  Approved Visits: [place]  Insurance Auth Dates:  CMS Certification Period: 8/9/23 to 11/9/23    Precautions:  Fall Risk: low   pacemaker, takes blood thinner/xarelto, depression    PT Clinical Problem: low back pain and right scapular pain    Problem List  1. Acute midline low back pain without sciatica            Subjective: feeling good, shoulder blade area is better but still tight    Objective:    Ortho   8/9/23  Lumbar x ray L4/L5 grade 1 spondylolithesis  seated rot 35 each stiff  seated thoracic ext 25  seated flexion 40   Sidebend 75% each  standing flexion -6 inch to floor   right shoulder elevation 150 stiff   deltoid 5/5 no pain  tender right trap     Treatment:  Scapular retractions 10x   Seated tubing rows 10x   OTD pulleys elevation bilateral shoulders  OTD pulleys 90/90 scap retractions 10x  seated thoracic and neck extension /flexion guided movements  seated one arm reach thoracic side bend L/R  seated bilateral arm propeller joseph torso stretch rotations   seated moist heat right shoulder blade  vibratory massager to righ scap boarder  Assessment:       Plan  PT Plan: Skilled PT  PT Frequency: 1 time per week  Duration: 4-8 weeks  Onset Date: 06/30/23  Certification Period Start Date: 08/09/23  Certification Period End Date: 11/09/23  Rehab Potential: Good  Plan of Care Agreement: Patient  Planned interventions include: home program, manual therapy and therapeutic exercises . thoracic mobility, seated ball rolls, postural exercise, torso twists, side bending, OTD pulleys shoulder mobility in upright position with wedge on back to chair to encourage spinal extension, hep, manual  "prn, back to wall posture, etc,.         Home Program/Education:  Access Code: [email or web address]  URL: [email or web address]  Date: 10/06/2023  Prepared by: Cesar Stroud    Exercises  - Seated Scapular Retraction  - 2 x daily - 10 reps - 5 hold  - Seated Low Row with Tubing  - 5 x weekly - 10 reps - 5 hold  - Seated Sidebending  - 2 x daily - 10 reps  - Seated Thoracic Flexion and Extension AROM Hands Behind the Head  - 2 x daily - 10 reps  - Seated Trunk Rotation - Arms Crossed  - 2 x daily - 10 reps        Care Plan/Goals:  Encounter Problems       Encounter Problems (Active)       PT Problem       PT Goal 1       Start:  10/06/23    Expected End:  11/09/23       1. The Global Rating of Change Score (GROC) will improve to 5/7 = \"a good deal better\"  2. Improve functional outcome tool score (FOTS) by > 10 points for Minimally Important Clinical Difference (MICD).  3. Patient/client will be independent with a HEP and self-management skills to further enhance recovery and progress.  4. Patient/client will verbalize >75% symptom reduction for frequency and severity of symptoms and/or > two point reduction of VAS/NPRS.  5. The Patient Specific Functional Scale metric (PSFS) will improve from baseline value to greater than 80%              "

## 2023-10-11 ENCOUNTER — OFFICE VISIT (OUTPATIENT)
Dept: PRIMARY CARE | Facility: CLINIC | Age: 83
End: 2023-10-11
Payer: MEDICARE

## 2023-10-11 VITALS
HEIGHT: 62 IN | BODY MASS INDEX: 29.26 KG/M2 | OXYGEN SATURATION: 95 % | DIASTOLIC BLOOD PRESSURE: 61 MMHG | SYSTOLIC BLOOD PRESSURE: 91 MMHG | WEIGHT: 159 LBS | HEART RATE: 79 BPM

## 2023-10-11 DIAGNOSIS — E03.8 OTHER SPECIFIED HYPOTHYROIDISM: Primary | ICD-10-CM

## 2023-10-11 DIAGNOSIS — B35.1 ONYCHOMYCOSIS: ICD-10-CM

## 2023-10-11 DIAGNOSIS — Z12.31 ENCOUNTER FOR SCREENING MAMMOGRAM FOR MALIGNANT NEOPLASM OF BREAST: ICD-10-CM

## 2023-10-11 DIAGNOSIS — D50.9 IRON DEFICIENCY ANEMIA, UNSPECIFIED IRON DEFICIENCY ANEMIA TYPE: ICD-10-CM

## 2023-10-11 DIAGNOSIS — M54.50 LUMBAR BACK PAIN: ICD-10-CM

## 2023-10-11 PROCEDURE — 36415 COLL VENOUS BLD VENIPUNCTURE: CPT

## 2023-10-11 PROCEDURE — 1160F RVW MEDS BY RX/DR IN RCRD: CPT | Performed by: STUDENT IN AN ORGANIZED HEALTH CARE EDUCATION/TRAINING PROGRAM

## 2023-10-11 PROCEDURE — 83550 IRON BINDING TEST: CPT

## 2023-10-11 PROCEDURE — 84443 ASSAY THYROID STIM HORMONE: CPT

## 2023-10-11 PROCEDURE — 83020 HEMOGLOBIN ELECTROPHORESIS: CPT | Performed by: STUDENT IN AN ORGANIZED HEALTH CARE EDUCATION/TRAINING PROGRAM

## 2023-10-11 PROCEDURE — 1036F TOBACCO NON-USER: CPT | Performed by: STUDENT IN AN ORGANIZED HEALTH CARE EDUCATION/TRAINING PROGRAM

## 2023-10-11 PROCEDURE — 85027 COMPLETE CBC AUTOMATED: CPT

## 2023-10-11 PROCEDURE — 83021 HEMOGLOBIN CHROMOTOGRAPHY: CPT

## 2023-10-11 PROCEDURE — 82728 ASSAY OF FERRITIN: CPT

## 2023-10-11 PROCEDURE — 99213 OFFICE O/P EST LOW 20 MIN: CPT | Performed by: STUDENT IN AN ORGANIZED HEALTH CARE EDUCATION/TRAINING PROGRAM

## 2023-10-11 PROCEDURE — 3074F SYST BP LT 130 MM HG: CPT | Performed by: STUDENT IN AN ORGANIZED HEALTH CARE EDUCATION/TRAINING PROGRAM

## 2023-10-11 PROCEDURE — 1159F MED LIST DOCD IN RCRD: CPT | Performed by: STUDENT IN AN ORGANIZED HEALTH CARE EDUCATION/TRAINING PROGRAM

## 2023-10-11 PROCEDURE — 1126F AMNT PAIN NOTED NONE PRSNT: CPT | Performed by: STUDENT IN AN ORGANIZED HEALTH CARE EDUCATION/TRAINING PROGRAM

## 2023-10-11 PROCEDURE — 3078F DIAST BP <80 MM HG: CPT | Performed by: STUDENT IN AN ORGANIZED HEALTH CARE EDUCATION/TRAINING PROGRAM

## 2023-10-11 PROCEDURE — 83540 ASSAY OF IRON: CPT

## 2023-10-11 RX ORDER — CICLOPIROX 80 MG/ML
SOLUTION TOPICAL NIGHTLY
Qty: 6.6 ML | Refills: 1 | Status: SHIPPED | OUTPATIENT
Start: 2023-10-11

## 2023-10-11 NOTE — PROGRESS NOTES
Brittany Suarez is a 83 y.o. female seen in Clinic at [facility] by Dr. Avery Hare on 10/11/23 for routine care, as well as for management of the following chronic medical conditions: DLD, Tachy-Alan s/p PPM, HypoT, prior lung adenocarcinoma (diagnosed 2018, follows with Dr. Barbara pickens). She presents today for follow up of hypothyroidism and KENDRICK. She also has acute concern for onychomycosis for which she has seen podiatry. Has been placing topical ciclopirox with some improvement but cost prohibitive. Provided with coupon through [employer], continues to cover less than 1/2 nail bed. If not improving in next 3-6 months, consider oral antifungal treatment. Regarding hypoT, TSH has normalized. No changes to current levothyroxine dosage advised. Hgb overall stable at 11.2 from 12.4 (prior range 11-12) over last few years. Continue iron supplementation, microcytic per labs. Hgb electrophoresis also ordered, possible concurrent alpha thal minor. Iron studies overall continue to be improved/stable from a few months ago. Continue to trend every 3-6 months, monitor for signs/symptoms of bleeding. Last colo in 2020 with diverticulosis but no specimens collected/pathology for review.     CHRONIC MEDICAL CONDITIONS:  #DLD  - Atorva 40  - LDL 90 per labs 07/2022    #Tachy-Alan Syndrome s/p pacemaker, PAFib  - AC   - Metoprolol 50+25mg   - Xarelto 20mg QHS     #HypoT  - Levothyroxine 75mcg dosing currently   - TSH WNL 10/2023    #Depression   - Paroxetine 20mg daily     #HTN  - BP historically on lower side  - Metoprolol only medication as above     #Lung Cancer  - Lung adeno; diagnosed 07/2018 s/p LLL lobectomy in 09/2018  - Follows with Dr. Barbara Pickens; last imaging in January 2023 reassuring; follows annually  - Prior smoker     #Anemia  - Iron supplementation 4x/week  - Repeat CBC and iron studies from June 2023 with improved indices   [  ] continue to trend every 3-6 months; persistent microcytosis, possible concurrent alpha  thal     #Osteoporosis   - patient defers treatment at this time  - continue to f/u; consider Rheum referral in past as she is refusing bisphosphonate  - Vitamin D and Calcium supplementation recommended     #Low Back Pain   - supportive care  - follows with PT   - plain films given lung adeno history: reassuring in 06/2023    Past Medical History: as above   Past Medical History:   Diagnosis Date    Acute upper respiratory infection, unspecified 11/01/2016    Viral upper respiratory infection    Bitten or stung by nonvenomous insect and other nonvenomous arthropods, initial encounter 12/01/2014    Nonvenomous insect bite of multiple sites    Disease of stomach and duodenum, unspecified 09/21/2013    Gastric and duodenal disease    Encounter for gynecological examination (general) (routine) without abnormal findings 11/17/2017    Visit for routine gyn exam    Encounter for screening for malignant neoplasm of rectum     Screening for cancer of the rectum    Headache, unspecified 10/31/2016    Sinus headache    Low back pain, unspecified 03/03/2016    Acute low back pain    Other conditions influencing health status 02/18/2017    History of cough    Other specified noninflammatory disorders of vagina 11/17/2017    Vaginal irritation    Other specified symptoms and signs involving the circulatory and respiratory systems 02/02/2017    Chest congestion    Personal history of other diseases of the musculoskeletal system and connective tissue 11/13/2020    History of osteoporosis    Personal history of other diseases of the respiratory system 02/02/2017    History of upper respiratory infection    Personal history of other diseases of the respiratory system 04/03/2017    History of acute sinusitis    Personal history of other medical treatment 11/17/2017    History of screening mammography    Urinary tract infection, site not specified 12/01/2014    Acute UTI     Subspecialty Medical Care: Pulm, Cardiology (as  needed)    Past Surgical History:  lung lobectomy; PPM  Past Surgical History:   Procedure Laterality Date    ANKLE SURGERY  02/17/2014    Ankle Surgery    APPENDECTOMY  06/18/2014    Appendectomy    CARDIAC PACEMAKER PLACEMENT  06/22/2015    Pacemaker Placement    CT GUIDED PERCUTANEOUS BIOPSY LUNG  8/13/2018    CT GUIDED PERCUTANEOUS BIOPSY LUNG 8/13/2018 [facility]    FOOT SURGERY  06/22/2015    Foot Repair    LUNG LOBECTOMY  12/11/2018    Lung Lobectomy    MOUTH SURGERY  06/22/2015    Oral Surgery Tooth Extraction    TONSILLECTOMY  06/22/2015    Tonsillectomy     Medications:  Current Outpatient Medications:     acetaminophen 500 mg capsule, Take 1 capsule (500 mg) by mouth twice a day., Disp: , Rfl:     atorvastatin (Lipitor) 40 mg tablet, Take 1 tablet (40 mg) by mouth once daily., Disp: , Rfl:     calcium carbonate 600 mg calcium (1,500 mg) tablet, Take 1 tablet (600 mg) by mouth once daily., Disp: , Rfl:     carboxymethylcellulose (Refresh Plus) 0.5 % ophthalmic solution, Administer into affected eye(s)., Disp: , Rfl:     cholecalciferol (Vitamin D-3) 50 mcg (2,000 unit) capsule, Take by mouth., Disp: , Rfl:     ciclopirox (Penlac) 8 % solution, Apply topically once daily at bedtime., Disp: 6.6 mL, Rfl: 1    clobetasol (Temovate) 0.05 % ointment, Apply a small amount to the vulva and perineum once daily, Disp: , Rfl:     ferrous sulfate 325 (65 Fe) MG tablet, Take by mouth., Disp: , Rfl:     ipratropium (Atrovent) 21 mcg (0.03 %) nasal spray, Administer into affected nostril(s)., Disp: , Rfl:     levothyroxine (Synthroid, Levoxyl) 75 mcg tablet, Take 1 tablet (75 mcg) by mouth once daily in the morning. Take before meals., Disp: 90 tablet, Rfl: 3    metoprolol succinate XL (Toprol-XL) 25 mg 24 hr tablet, , Disp: , Rfl:     metoprolol succinate XL (Toprol-XL) 50 mg 24 hr tablet, , Disp: , Rfl:     mupirocin (Bactroban) 2 % ointment, Apply topically 2 times a day. Apply sparingly to affected areas, Disp: ,  "Rfl:     olopatadine (Patanol) 0.1 % ophthalmic solution, 1 drop once daily. Into affected eyes as directed, Disp: , Rfl:     PARoxetine (Paxil) 20 mg tablet, TAKE 1 TABLET BY MOUTH DAILY, Disp: 90 tablet, Rfl: 3    PARoxetine (Paxil) 20 mg tablet, Take 1 tablet daily., Disp: 90 tablet, Rfl: 2    rivaroxaban (Xarelto) 20 mg tablet, Take by mouth., Disp: , Rfl:     tiZANidine (Zanaflex) 2 mg tablet, Take 1 tablet (2 mg) by mouth every 8 hours if needed for muscle spasms for up to 10 days., Disp: 30 tablet, Rfl: 0  Pharmacy: [facility]; [facility]; [phone]    Allergies:   Allergies   Allergen Reactions    Amiodarone Nausea Only     Immunizations: Flu 2023 done; updated COVID booster 2023 completed; RSV vaccine recommended     Family History:   - Brother passed away from PE, CLL   - Sister with Leiomyosarcoma (s/p hysterectomy); sister with asthma   - Mother with AML (age 40)  - Father with CVA (age 57)  - Son with GERD; VATS/Decortication s/p PNA     Family History   Problem Relation Name Age of Onset    Leukemia Mother      Leukemia Brother      Blood clot Brother      Heart attack Brother      Diabetes Mother's Sister      Other (CVA) Mother's Sister      Hyperlipidemia Father's Sister      Heart disease Father's Sister       Social History:   Home/Living Situation/Falls/Safety Assessment: lives at home alone; feels safe at home   Education/Employment/Work/Vocational: retired from    Activities: volunteering prior to the pandemic; avid reader; walking   Drug Use: prior smoker (50 years; up to pack a day); quit 10+ years ago   Diet: no dietary concerns   Depression/Anxiety: on SSRI   Sexuality/Contraception/Menstrual History:   Sleep: deep sleeper; feels she sleeps too much     Patient Information:  Health Insurance: has insurance   Transportation: drives  Healthcare POA/Guardian: Sister Michael) [phone]  Contact Information: [phone]    Visit Vitals  BP 91/61   Pulse 79   Ht 1.575 m (5' 2\") "   Wt 72.1 kg (159 lb)   SpO2 95%   BMI 29.08 kg/m²   Smoking Status Former   BSA 1.78 m²      PHYSICAL EXAM:   General:  female in NAD, well appearing   HEENT: NCAT, MMM  CV: RRR in office today   PULM: CTAB with diminished sounds at L base in setting of prior LLL lobectomy   ABD: soft, NT, ND, no rebound/guarding  : no suprapubic or CVA tenderness  EXT: WWP, no edema; onychomycosis involving <50% of great toe noted   SKIN: no rashes noted  NEURO: A&Ox3, no gross motor or sensory deficits, ambulates without assistance  PSYCH: pleasant mood, appropriate affect     Assessment/Plan    Brittany Suarez is a 83 y.o. female seen in Clinic at [facility] by Dr. Avery Hare on 10/11/23 for routine care, as well as for management of the following chronic medical conditions: DLD, Tachy-Alan s/p PPM, HypoT, prior lung adenocarcinoma (diagnosed 2018, follows with pulm, Dr. Jimenez). She presents today for follow up of hypothyroidism and KENDRICK. She also has acute concern for onychomycosis for which she has seen podiatry. Has been placing topical ciclopirox with some improvement but cost prohibitive. Provided with coupon through [employer], continues to cover less than 1/2 nail bed. If not improving in next 3-6 months, consider oral antifungal treatment. Regarding hypoT, TSH has normalized. No changes to current levothyroxine dosage advised. Hgb overall stable at 11.2 from 12.4 (prior range 11-12) over last few years. Continue iron supplementation, microcytic per labs. Hgb electrophoresis also ordered, possible concurrent alpha thal minor. Iron studies overall continue to be improved/stable from a few months ago. Continue to trend every 3-6 months, monitor for signs/symptoms of bleeding. Last colo in 2020 with diverticulosis but no specimens collected/pathology for review.     CHRONIC MEDICAL CONDITIONS:  #DLD  - Atorva 40  - LDL 90 per labs 07/2022    #Tachy-Alan Syndrome s/p pacemaker, PAFib  - AC   - Metoprolol 50+25mg   -  Xarelto 20mg QHS     #HypoT  - Levothyroxine 75mcg dosing currently   - TSH WNL 10/2023    #Depression   - Paroxetine 20mg daily     #HTN  - BP historically on lower side  - Metoprolol only medication as above     #Lung Cancer  - Lung adeno; diagnosed 07/2018 s/p LLL lobectomy in 09/2018  - Follows with Pulm, Dr. Jimenez; last imaging in January 2023 reassuring; follows annually  - Prior smoker     #Anemia  - Iron supplementation 4x/week  - Repeat CBC and iron studies from June 2023 with improved indices   [  ] continue to trend every 3-6 months; persistent microcytosis, possible concurrent alpha thal     #Osteoporosis   - patient defers treatment at this time  - continue to f/u; consider Rheum referral in past as she is refusing bisphosphonate  - Vitamin D and Calcium supplementation recommended     #Low Back Pain   - supportive care  - follows with PT   - plain films given lung adeno history: reassuring in 06/2023    #Health Maintenance    Cancer Screening  - Cervical Cancer Screening: last pap smear/HPV testing: further screening not recommended given age    - Mammography: due July 2023; previously ordered, not yet done; re-ordered today   - Colorectal Cancer Screening: defers; last 2020   - Lung Cancer: prior history, follows with pulm annually     Laboratory Screening  - Lipid Screen: on statin, last labs 07/2022  - A1C, glucose screen: non-obese; reassuring CMP 01/2023  - STI, HIV, Hep B screen: defers  - Hep C screen: defers    Imaging Screening  - Osteoporosis/DEXA screening: Jan 2023, patient defers treatment at this time     Immunizations:   - Influenza: 2023 UTD   - COVID: fall 2023 UTD  - RSV: recommended   - Tdap: due 2027  - Prevnar, Pneumovax: UTD  - Shingrix: UTD     Other Screening  - Health Literacy Assessment: Appropriate   - Depression screen: NEGATIVE   - Home safety/partner violence screen: NEGATIVE   - Alcohol/tobacco/drug use screen: prior smoker   - Healthcare POA/Advanced Directives: Sister      Referrals: labs, mammogram     Patient Discussion:    Please call back the office with any questions at [phone]. In the case of an emergency, please call 911 or go to the nearest Emergency Department.     RTC in 3-6 months for [facility] visit (anytime after November 2023), sooner if acute issues arise.     Avery Hare MD  Internal Medicine-Pediatrics  [facility] [address], [address]  P: [phone], F: [phone]

## 2023-10-12 LAB
ERYTHROCYTE [DISTWIDTH] IN BLOOD BY AUTOMATED COUNT: 15.2 % (ref 11.5–14.5)
FERRITIN SERPL-MCNC: 112 NG/ML (ref 8–150)
HCT VFR BLD AUTO: 37.5 % (ref 36–46)
HEMOGLOBIN A2: 2.5 % (ref 2–3.5)
HEMOGLOBIN A: 97.2 % (ref 95.8–98)
HEMOGLOBIN C: 0 %
HEMOGLOBIN F: 0.3 % (ref 0–2)
HEMOGLOBIN IDENTIFICATION INTERPRETATION: ABNORMAL
HEMOGLOBIN OTHER: 0 %
HEMOGLOBIN S: 0 %
HGB BLD-MCNC: 11.2 G/DL (ref 12–16)
IRON SATN MFR SERPL: 30 % (ref 25–45)
IRON SERPL-MCNC: 118 UG/DL (ref 35–150)
MCH RBC QN AUTO: 22.4 PG (ref 26–34)
MCHC RBC AUTO-ENTMCNC: 29.9 G/DL (ref 32–36)
MCV RBC AUTO: 75 FL (ref 80–100)
NRBC BLD-RTO: 0 /100 WBCS (ref 0–0)
PATH REVIEW-HGB IDENTIFICATION: ABNORMAL
PLATELET # BLD AUTO: 239 X10*3/UL (ref 150–450)
PMV BLD AUTO: 11 FL (ref 7.5–11.5)
RBC # BLD AUTO: 5.01 X10*6/UL (ref 4–5.2)
TIBC SERPL-MCNC: 394 UG/DL (ref 240–445)
TSH SERPL-ACNC: 1.66 MIU/L (ref 0.44–3.98)
UIBC SERPL-MCNC: 276 UG/DL (ref 110–370)
WBC # BLD AUTO: 5.4 X10*3/UL (ref 4.4–11.3)

## 2023-10-20 ENCOUNTER — OFFICE VISIT (OUTPATIENT)
Dept: OBSTETRICS AND GYNECOLOGY | Facility: CLINIC | Age: 83
End: 2023-10-20
Payer: MEDICARE

## 2023-10-20 VITALS
BODY MASS INDEX: 29.15 KG/M2 | DIASTOLIC BLOOD PRESSURE: 58 MMHG | HEIGHT: 62 IN | SYSTOLIC BLOOD PRESSURE: 96 MMHG | WEIGHT: 158.4 LBS

## 2023-10-20 DIAGNOSIS — L90.0 LICHEN SCLEROSUS: Primary | ICD-10-CM

## 2023-10-20 PROCEDURE — 1126F AMNT PAIN NOTED NONE PRSNT: CPT | Performed by: NURSE PRACTITIONER

## 2023-10-20 PROCEDURE — 99213 OFFICE O/P EST LOW 20 MIN: CPT | Performed by: NURSE PRACTITIONER

## 2023-10-20 PROCEDURE — 1036F TOBACCO NON-USER: CPT | Performed by: NURSE PRACTITIONER

## 2023-10-20 PROCEDURE — 3078F DIAST BP <80 MM HG: CPT | Performed by: NURSE PRACTITIONER

## 2023-10-20 PROCEDURE — 3074F SYST BP LT 130 MM HG: CPT | Performed by: NURSE PRACTITIONER

## 2023-10-20 PROCEDURE — 1159F MED LIST DOCD IN RCRD: CPT | Performed by: NURSE PRACTITIONER

## 2023-10-20 PROCEDURE — 1160F RVW MEDS BY RX/DR IN RCRD: CPT | Performed by: NURSE PRACTITIONER

## 2023-10-20 RX ORDER — CLOBETASOL PROPIONATE 0.5 MG/G
OINTMENT TOPICAL 2 TIMES WEEKLY
Qty: 30 G | Refills: 1 | Status: SHIPPED | OUTPATIENT
Start: 2023-10-23 | End: 2024-06-04 | Stop reason: SDUPTHER

## 2023-10-20 ASSESSMENT — ENCOUNTER SYMPTOMS
CONSTITUTIONAL NEGATIVE: 0
LOSS OF SENSATION IN FEET: 0
RESPIRATORY NEGATIVE: 0
MUSCULOSKELETAL NEGATIVE: 0
DEPRESSION: 1
EYES NEGATIVE: 0
ALLERGIC/IMMUNOLOGIC NEGATIVE: 0
ENDOCRINE NEGATIVE: 0
PSYCHIATRIC NEGATIVE: 0
GASTROINTESTINAL NEGATIVE: 0
NEUROLOGICAL NEGATIVE: 0
CARDIOVASCULAR NEGATIVE: 0
HEMATOLOGIC/LYMPHATIC NEGATIVE: 0
OCCASIONAL FEELINGS OF UNSTEADINESS: 1

## 2023-10-20 ASSESSMENT — PAIN SCALES - GENERAL: PAINLEVEL: 0-NO PAIN

## 2023-10-20 NOTE — PROGRESS NOTES
Subjective   Patient ID: Brittany Suarez is a 83 y.o. female who presents for Follow-up (Pt states she's here to follow up for rash on her vulva. ).  HPI  H/o LS, applies clobetasol once weekly  No pruritus or pain  Vaginal hygiene: water, soap-dove   Review of Systems    Objective   Physical Exam  Genitourinary:         Comments: Scant amount of active LS at the perineum  Pt showed with a mirror where to apply the clobetasol  No lesions        Assessment/Plan   Diagnoses and all orders for this visit:  Lichen sclerosus       Pt encouraged to apply clobetasol twice weekly  It is easier for patient to come to the office annually rather than every 6 months; pt aware to come to the office sooner if she has any itching or pain

## 2023-10-23 ENCOUNTER — TELEPHONE (OUTPATIENT)
Dept: PRIMARY CARE | Facility: CLINIC | Age: 83
End: 2023-10-23

## 2023-10-25 ENCOUNTER — TELEPHONE (OUTPATIENT)
Dept: OBSTETRICS AND GYNECOLOGY | Facility: CLINIC | Age: 83
End: 2023-10-25
Payer: MEDICARE

## 2023-10-26 NOTE — TELEPHONE ENCOUNTER
Pt verified by name and .  Pt stated [facility] had a questions regarding her clobetasol rx.    Nurse called [facility]  Spoke to Daniel CAMARA the pharmacist.  Verified pt by name and .  He stated paperwork was faxed over.  rx is in progress.    Pt is aware of the above infromation.

## 2023-11-28 DIAGNOSIS — I48.0 PAROXYSMAL ATRIAL FIBRILLATION (MULTI): ICD-10-CM

## 2023-11-28 RX ORDER — RIVAROXABAN 20 MG/1
20 TABLET, FILM COATED ORAL
Qty: 90 TABLET | Refills: 3 | Status: SHIPPED | OUTPATIENT
Start: 2023-11-28 | End: 2024-01-10 | Stop reason: SDUPTHER

## 2023-12-19 ENCOUNTER — TELEPHONE (OUTPATIENT)
Dept: PRIMARY CARE | Facility: CLINIC | Age: 83
End: 2023-12-19

## 2023-12-20 ENCOUNTER — ANCILLARY PROCEDURE (OUTPATIENT)
Dept: RADIOLOGY | Facility: CLINIC | Age: 83
End: 2023-12-20
Payer: MEDICARE

## 2023-12-20 DIAGNOSIS — C34.90 MALIGNANT NEOPLASM OF UNSPECIFIED PART OF UNSPECIFIED BRONCHUS OR LUNG (MULTI): ICD-10-CM

## 2023-12-20 PROCEDURE — 71250 CT THORAX DX C-: CPT | Performed by: RADIOLOGY

## 2023-12-20 PROCEDURE — 71250 CT THORAX DX C-: CPT

## 2023-12-31 DIAGNOSIS — I48.91 ATRIAL FIBRILLATION AND FLUTTER (MULTI): ICD-10-CM

## 2023-12-31 DIAGNOSIS — E78.00 HYPERCHOLESTEROLEMIA: Primary | ICD-10-CM

## 2023-12-31 DIAGNOSIS — I48.92 ATRIAL FIBRILLATION AND FLUTTER (MULTI): ICD-10-CM

## 2024-01-02 ENCOUNTER — APPOINTMENT (OUTPATIENT)
Dept: PULMONOLOGY | Facility: HOSPITAL | Age: 84
End: 2024-01-02
Payer: MEDICARE

## 2024-01-02 RX ORDER — ATORVASTATIN CALCIUM 40 MG/1
40 TABLET, FILM COATED ORAL NIGHTLY
Qty: 90 TABLET | Refills: 0 | Status: SHIPPED | OUTPATIENT
Start: 2024-01-02 | End: 2024-03-11

## 2024-01-02 RX ORDER — METOPROLOL SUCCINATE 25 MG/1
25 TABLET, EXTENDED RELEASE ORAL DAILY
Qty: 90 TABLET | Refills: 0 | Status: SHIPPED | OUTPATIENT
Start: 2024-01-02

## 2024-01-03 ENCOUNTER — HOSPITAL ENCOUNTER (OUTPATIENT)
Dept: CARDIOLOGY | Facility: CLINIC | Age: 84
Discharge: HOME | End: 2024-01-03
Payer: MEDICARE

## 2024-01-03 DIAGNOSIS — I44.2 COMPLETE ATRIOVENTRICULAR BLOCK (MULTI): Primary | ICD-10-CM

## 2024-01-03 DIAGNOSIS — I48.91 A-FIB (MULTI): ICD-10-CM

## 2024-01-03 DIAGNOSIS — Z95.0 PRESENCE OF CARDIAC PACEMAKER: ICD-10-CM

## 2024-01-03 PROCEDURE — 93280 PM DEVICE PROGR EVAL DUAL: CPT | Performed by: INTERNAL MEDICINE

## 2024-01-03 PROCEDURE — 93280 PM DEVICE PROGR EVAL DUAL: CPT

## 2024-01-09 ENCOUNTER — APPOINTMENT (OUTPATIENT)
Dept: CARDIOLOGY | Facility: CLINIC | Age: 84
End: 2024-01-09
Payer: MEDICARE

## 2024-01-09 PROBLEM — K31.9 GASTRIC AND DUODENAL DISEASE: Status: ACTIVE | Noted: 2024-01-09

## 2024-01-09 PROBLEM — R09.89 PULMONARY CONGESTION: Status: ACTIVE | Noted: 2024-01-09

## 2024-01-09 PROBLEM — M81.0 OSTEOPOROSIS: Status: ACTIVE | Noted: 2023-01-17

## 2024-01-09 PROBLEM — D50.9 IRON DEFICIENCY ANEMIA: Status: ACTIVE | Noted: 2024-01-09

## 2024-01-10 ENCOUNTER — APPOINTMENT (OUTPATIENT)
Dept: PRIMARY CARE | Facility: CLINIC | Age: 84
End: 2024-01-10
Payer: MEDICARE

## 2024-01-10 ENCOUNTER — OFFICE VISIT (OUTPATIENT)
Dept: CARDIOLOGY | Facility: CLINIC | Age: 84
End: 2024-01-10
Payer: MEDICARE

## 2024-01-10 VITALS
SYSTOLIC BLOOD PRESSURE: 103 MMHG | BODY MASS INDEX: 27.7 KG/M2 | OXYGEN SATURATION: 94 % | HEIGHT: 63 IN | WEIGHT: 156.3 LBS | DIASTOLIC BLOOD PRESSURE: 66 MMHG | HEART RATE: 65 BPM

## 2024-01-10 DIAGNOSIS — I48.0 PAROXYSMAL ATRIAL FIBRILLATION (MULTI): ICD-10-CM

## 2024-01-10 DIAGNOSIS — I10 BENIGN ESSENTIAL HYPERTENSION: ICD-10-CM

## 2024-01-10 PROCEDURE — 1159F MED LIST DOCD IN RCRD: CPT | Performed by: INTERNAL MEDICINE

## 2024-01-10 PROCEDURE — 93005 ELECTROCARDIOGRAM TRACING: CPT | Performed by: INTERNAL MEDICINE

## 2024-01-10 PROCEDURE — 3074F SYST BP LT 130 MM HG: CPT | Performed by: INTERNAL MEDICINE

## 2024-01-10 PROCEDURE — 3078F DIAST BP <80 MM HG: CPT | Performed by: INTERNAL MEDICINE

## 2024-01-10 PROCEDURE — 1036F TOBACCO NON-USER: CPT | Performed by: INTERNAL MEDICINE

## 2024-01-10 PROCEDURE — 99214 OFFICE O/P EST MOD 30 MIN: CPT | Performed by: INTERNAL MEDICINE

## 2024-01-10 PROCEDURE — 93010 ELECTROCARDIOGRAM REPORT: CPT | Performed by: INTERNAL MEDICINE

## 2024-01-10 PROCEDURE — 1160F RVW MEDS BY RX/DR IN RCRD: CPT | Performed by: INTERNAL MEDICINE

## 2024-01-10 PROCEDURE — 1126F AMNT PAIN NOTED NONE PRSNT: CPT | Performed by: INTERNAL MEDICINE

## 2024-01-10 ASSESSMENT — COLUMBIA-SUICIDE SEVERITY RATING SCALE - C-SSRS
1. IN THE PAST MONTH, HAVE YOU WISHED YOU WERE DEAD OR WISHED YOU COULD GO TO SLEEP AND NOT WAKE UP?: NO
2. HAVE YOU ACTUALLY HAD ANY THOUGHTS OF KILLING YOURSELF?: NO
6. HAVE YOU EVER DONE ANYTHING, STARTED TO DO ANYTHING, OR PREPARED TO DO ANYTHING TO END YOUR LIFE?: NO

## 2024-01-10 ASSESSMENT — PATIENT HEALTH QUESTIONNAIRE - PHQ9
SUM OF ALL RESPONSES TO PHQ9 QUESTIONS 1 AND 2: 0
2. FEELING DOWN, DEPRESSED OR HOPELESS: NOT AT ALL
1. LITTLE INTEREST OR PLEASURE IN DOING THINGS: NOT AT ALL

## 2024-01-10 ASSESSMENT — ENCOUNTER SYMPTOMS
OCCASIONAL FEELINGS OF UNSTEADINESS: 0
DEPRESSION: 0
LOSS OF SENSATION IN FEET: 0

## 2024-01-10 NOTE — PROGRESS NOTES
Primary Care Physician: Avery Hare MD  Date of Visit: 01/10/2024  3:40 PM EST  Location of visit: [facility] [address]   Last office visit: January 4, 2021    Chief Complaint:     Follow-up     HPI/Summary  Brittany Suarez is a 83 y.o. female who presents for followup cardiology evaluation.     The patient has not been seen in several years.  She is status post pacemaker implantation for management of tachybradycardia syndrome, and is anticoagulated.  In 2018, she underwent lobectomy and lymph node dissection for management of lung cancer.  There was a short episode of atrial fibrillation following the surgery.    She is being treated with atorvastatin, metoprolol succinate and Xarelto.    She is feeling well.  She had experienced few brief episodes of palpitations, resolved after up titration of metoprolol to 75 mg daily.  No anticoagulation related complications.  She is independent with self-care, no chest pain or exertional dyspnea reported.    Specialty Problems          Cardiology Problems    Atrial fibrillation and flutter (CMS/HCC)    Benign essential hypertension    Complete atrioventricular block (CMS/HCC)    Hypercholesterolemia    Hyperlipidemia    Lung cancer (CMS/HCC)    On rivaroxaban therapy    Paroxysmal atrial fibrillation (CMS/HCC)    Presence of cardiac pacemaker    Tachycardia-bradycardia syndrome (CMS/HCC)        Past Medical History:   Diagnosis Date   • Acute upper respiratory infection, unspecified 11/01/2016    Viral upper respiratory infection   • Bitten or stung by nonvenomous insect and other nonvenomous arthropods, initial encounter 12/01/2014    Nonvenomous insect bite of multiple sites   • Disease of stomach and duodenum, unspecified 09/21/2013    Gastric and duodenal disease   • Encounter for gynecological examination (general) (routine) without abnormal findings 11/17/2017    Visit for routine gyn exam   • Encounter for screening for malignant neoplasm of rectum     Screening for  cancer of the rectum   • Headache, unspecified 10/31/2016    Sinus headache   • Low back pain, unspecified 03/03/2016    Acute low back pain   • Other conditions influencing health status 02/18/2017    History of cough   • Other specified noninflammatory disorders of vagina 11/17/2017    Vaginal irritation   • Other specified symptoms and signs involving the circulatory and respiratory systems 02/02/2017    Chest congestion   • Personal history of other diseases of the musculoskeletal system and connective tissue 11/13/2020    History of osteoporosis   • Personal history of other diseases of the respiratory system 02/02/2017    History of upper respiratory infection   • Personal history of other diseases of the respiratory system 04/03/2017    History of acute sinusitis   • Personal history of other medical treatment 11/17/2017    History of screening mammography   • Urinary tract infection, site not specified 12/01/2014    Acute UTI          Past Surgical History:   Procedure Laterality Date   • ANKLE SURGERY  02/17/2014    Ankle Surgery   • APPENDECTOMY  06/18/2014    Appendectomy   • CARDIAC PACEMAKER PLACEMENT  06/22/2015    Pacemaker Placement   • CT GUIDED PERCUTANEOUS BIOPSY LUNG  8/13/2018    CT GUIDED PERCUTANEOUS BIOPSY LUNG 8/13/2018 [facility] AIB LEGACY   • FOOT SURGERY  06/22/2015    Foot Repair   • LUNG LOBECTOMY  12/11/2018    Lung Lobectomy   • MOUTH SURGERY  06/22/2015    Oral Surgery Tooth Extraction   • TONSILLECTOMY  06/22/2015    Tonsillectomy            Social History     Tobacco Use   • Smoking status: Former     Types: Cigarettes   • Smokeless tobacco: Never   Substance Use Topics   • Alcohol use: Not Currently   • Drug use: Never        Physical Activity: Not on file            Allergies   Allergen Reactions   • Amiodarone Nausea Only         Current Outpatient Medications   Medication Instructions   • acetaminophen 500 mg capsule 1 capsule, oral, 2 times daily   • atorvastatin (LIPITOR) 40 mg,  "oral, Nightly   • calcium carbonate 600 mg calcium (1,500 mg) tablet 1 tablet, oral, Daily   • carboxymethylcellulose (Refresh Plus) 0.5 % ophthalmic solution ophthalmic (eye)   • cholecalciferol (Vitamin D-3) 50 mcg (2,000 unit) capsule oral   • ciclopirox (Penlac) 8 % solution Topical, Nightly   • clobetasol (Temovate) 0.05 % ointment Topical, 2 times weekly, Apply a thin layer to the vulva, below the vaginal opening and onto the perineum   • ferrous sulfate 325 (65 Fe) MG tablet oral   • ipratropium (Atrovent) 21 mcg (0.03 %) nasal spray nasal   • levothyroxine (SYNTHROID, LEVOXYL) 75 mcg, oral, Daily before breakfast   • metoprolol succinate XL (Toprol-XL) 50 mg 24 hr tablet Take 1 tablet (50 mg) by mouth once daily. Along with 25 mg for total 75 mg daily.   • metoprolol succinate XL (TOPROL-XL) 25 mg, oral, Daily   • olopatadine (Patanol) 0.1 % ophthalmic solution 1 drop, Daily, Into affected eyes as directed   • PARoxetine (Paxil) 20 mg tablet TAKE 1 TABLET BY MOUTH DAILY   • Xarelto 20 mg, oral, Daily with evening meal       ROS     Vital Signs:  Vitals:    01/10/24 1614   BP: 103/66   BP Location: Left arm   Patient Position: Sitting   BP Cuff Size: Large adult   Pulse: 65   SpO2: 94%   Weight: 70.9 kg (156 lb 4.8 oz)   Height: 1.588 m (5' 2.5\")     Wt Readings from Last 2 Encounters:   01/10/24 70.9 kg (156 lb 4.8 oz)   10/20/23 71.8 kg (158 lb 6.4 oz)     Body mass index is 28.13 kg/m².       Physical Exam:    She appeared well.  No carotid bruits noted.  Clear lung fields.  Heart sounds regular, no murmurs clicks gallops or rubs.  No edema.  Examination otherwise deferred today.  Last Labs:  CMP:  Recent Labs     01/17/23  1533 07/11/22  1457 08/03/21  1306 11/13/20  1425 09/16/18  0615    139 140 141 141   K 4.2 4.9 4.4 4.8 4.2    104 103 105 106   CO2 30 29 30 33* 24   ANIONGAP 10 11 11 8* 15   BUN 16 16 18 15 16   CREATININE 0.79 0.80 0.74 0.63 0.91   GLUCOSE 87 99 96 93 111*     Recent " "Labs     01/17/23  1533 07/11/22  1457 08/03/21  1306 11/13/20  1425 09/16/18  0615 09/06/18  1514   ALBUMIN 4.5 4.2 4.2 4.1 3.1* 4.5   ALKPHOS 66 74 80 77  --  77   ALT 33 23 29 16  --  22   AST 30 23 23 19  --  23   BILITOT 0.4 0.6 0.5 0.4  --  0.6     CBC:  Recent Labs     10/11/23  1605 06/16/23  1436 01/17/23  1533 07/11/22  1457 08/03/21  1306   WBC 5.4 5.3 5.9 4.7 5.5   HGB 11.2* 12.4 11.0* 11.8* 11.7*   HCT 37.5 40.5 35.5* 38.9 38.3    264 275 223 221   MCV 75* 75* 74* 73* 75*     COAG:   Recent Labs     09/14/18  0836 09/13/18  0649 09/06/18  1514 08/07/18  1401 11/10/17  1625   INR 1.1 1.1 1.2* 1.2* 1.0     HEME/ENDO:  Recent Labs     10/11/23  1605 06/16/23  1436 04/07/23  1442 01/17/23  1533 02/17/21  1358 11/13/20  1425 12/05/19  1454 12/03/18  1437   FERRITIN 112 94  --  71  --  52  --   --    IRONSAT 30 28  --  15*  --  22*  --   --    TSH 1.66 4.58* 9.47* 6.27*   < > 0.14*  --   --    HGBA1C  --   --   --   --   --   --  5.9 5.8    < > = values in this interval not displayed.      CARDIAC: No results for input(s): \"LDH\", \"CKMB\", \"TROPHS\", \"BNP\" in the last 50296 hours.    No lab exists for component: \"CK\", \"CKMBP\"  Recent Labs     07/11/22  1457 08/03/21  1306 11/13/20  1425   CHOL 173 187 149   LDLF 90 105* 77   HDL 46.5 48.8 50.2   TRIG 181* 164* 111       Last Cardiology Tests:    ECG:    Electronic atrial pacemaker, nonspecific ST and T wave abnormality.    Echo:  Echo Results:  No results found for this or any previous visit from the past 3650 days.       Cath:      Stress Test:  Stress Results:  No results found for this or any previous visit from the past 365 days.         Cardiac Imaging:        Assessment/Plan   Stable cardiovascular status in a patient with a history of tachybradycardia syndrome, pacemaker plantation and chronic anticoagulation.  Blood pressure is at goal, lipids satisfactory.  Medication compliance reviewed and is excellent.  No need for any cardiac testing.  She " can be monitored by primary care, and we will continue to review [facility] reports as needed.  We provided a 1 year supply of Xarelto.        Orders:  Orders Placed This Encounter   Procedures   • ECG 12 lead (Clinic Performed)      Followup Appts:  Future Appointments   Date Time Provider Department [place]   1/11/2024  2:00 PM Avery Hare MD [id number] [facility]   1/30/2024  1:40 PM Conrad Jimenez MD [id number] [facility]   2/1/2024  2:00 PM [facility] [id number] MAMMO 1 [id number] [facility]           ____________________________________________________________  Kyle Ba MD    Senior Attending Physician  [facility]  [facility]    [facility] Chair for Cardiovascular Excellence  [employer]

## 2024-01-11 ENCOUNTER — OFFICE VISIT (OUTPATIENT)
Dept: PRIMARY CARE | Facility: CLINIC | Age: 84
End: 2024-01-11
Payer: MEDICARE

## 2024-01-11 VITALS
HEIGHT: 63 IN | OXYGEN SATURATION: 94 % | SYSTOLIC BLOOD PRESSURE: 111 MMHG | BODY MASS INDEX: 27.82 KG/M2 | WEIGHT: 157 LBS | DIASTOLIC BLOOD PRESSURE: 71 MMHG | HEART RATE: 74 BPM

## 2024-01-11 DIAGNOSIS — G89.29 CHRONIC LOW BACK PAIN WITH RIGHT-SIDED SCIATICA, UNSPECIFIED BACK PAIN LATERALITY: Primary | ICD-10-CM

## 2024-01-11 DIAGNOSIS — M54.41 CHRONIC LOW BACK PAIN WITH RIGHT-SIDED SCIATICA, UNSPECIFIED BACK PAIN LATERALITY: Primary | ICD-10-CM

## 2024-01-11 DIAGNOSIS — J32.0 CHRONIC MAXILLARY SINUSITIS: ICD-10-CM

## 2024-01-11 PROCEDURE — 99214 OFFICE O/P EST MOD 30 MIN: CPT | Performed by: STUDENT IN AN ORGANIZED HEALTH CARE EDUCATION/TRAINING PROGRAM

## 2024-01-11 PROCEDURE — 1159F MED LIST DOCD IN RCRD: CPT | Performed by: STUDENT IN AN ORGANIZED HEALTH CARE EDUCATION/TRAINING PROGRAM

## 2024-01-11 PROCEDURE — 1036F TOBACCO NON-USER: CPT | Performed by: STUDENT IN AN ORGANIZED HEALTH CARE EDUCATION/TRAINING PROGRAM

## 2024-01-11 PROCEDURE — 3074F SYST BP LT 130 MM HG: CPT | Performed by: STUDENT IN AN ORGANIZED HEALTH CARE EDUCATION/TRAINING PROGRAM

## 2024-01-11 PROCEDURE — 1160F RVW MEDS BY RX/DR IN RCRD: CPT | Performed by: STUDENT IN AN ORGANIZED HEALTH CARE EDUCATION/TRAINING PROGRAM

## 2024-01-11 PROCEDURE — 3078F DIAST BP <80 MM HG: CPT | Performed by: STUDENT IN AN ORGANIZED HEALTH CARE EDUCATION/TRAINING PROGRAM

## 2024-01-11 PROCEDURE — 1126F AMNT PAIN NOTED NONE PRSNT: CPT | Performed by: STUDENT IN AN ORGANIZED HEALTH CARE EDUCATION/TRAINING PROGRAM

## 2024-01-11 RX ORDER — TIZANIDINE 2 MG/1
2 TABLET ORAL EVERY 8 HOURS PRN
Qty: 30 TABLET | Refills: 0 | Status: SHIPPED | OUTPATIENT
Start: 2024-01-11 | End: 2024-01-21

## 2024-01-11 NOTE — PROGRESS NOTES
"Brittany Suarez is a 83 y.o. female seen in Clinic at [facility] by Dr. Avery Hare on 01/11/24 for routine care, as well as for management of the following chronic medical conditions: DLD, Tachy-Alan s/p PPM, HypoT, prior lung adenocarcinoma (diagnosed 2018, follows with pulm, Dr. Jimenez). She presents today for follow up of recurrent low back pain. She has been seen for this issue in the past with episode in 06/2023, at which time she was treated with steroid, low dose muscle relaxant, PT referral. Plain films obtained (given history of lung adeno) showed moderate facet arthropathy at L4-L5 and L5-S1, mild spondylosis, Grade 1 L4-L5 anterolisthesis. She states her pacemaker is NOT MRI compatible and symptoms initially improved after PT, thus advanced spinal imaging was not pursued. She believes she may have recently exacerbated her pain with some lifting of heavier than expected items. She has no focal weakness, preserved reflexes and no bowel/bladder incontinence. No point tenderness on spinal exam. No overlying skin changes/fevers/etc. Given she was provided relief with muscle relaxant in past, will again pursue. Can re-trial systemic oral steroids if needed but will defer empirically for now. Continue PT exercises as learned. If persistent/refractory, plan for pain management referral to discuss procedural interventions that may provide longer lasting relief.      #Low Back Pain, recurrent   - recurrent low dose muscle relaxant trial   - continue PT exercises   - plain films (lung adeno history, no concerning lytic lesions) from 06/2023 showed moderate facet arthropathy at L4-L5 and L5-S1, mild spondylosis, Grade 1 L4-L5 anterolisthesis  - per patient, has been told pacemaker is NOT MRI compatible  [  ] pain referral today if refractory pain/symptoms     #Chronic Sinusitis   - chronic issue for \"decades\" per patient  - Ipratropium nasal spray  - discussed intranasal corticosteroids as adjunct  [  ] ENT referral " given prolonged duration of symptoms (decades) with poor symptom control at present and no prior workup     CHRONIC MEDICAL CONDITIONS:  #DLD  - Atorva 40  - LDL 90 per labs 07/2022  [  ] repeat lipids with next fasting labs     #Tachy-Alan Syndrome s/p pacemaker, PAFib  - AC   - Metoprolol 50+25mg   - Xarelto 20mg QHS     #HypoT  - Levothyroxine 75mcg dosing currently   - TSH WNL 10/2023    #Depression   - Paroxetine 20mg daily     #HTN  - BP historically on lower side  - Metoprolol only medication as above     #Lung Cancer  - Lung adeno; diagnosed 07/2018 s/p LLL lobectomy in 09/2018  - Follows with Pulm, Dr. Jimenez; last imaging in January 2023 reassuring; follows annually  - Prior smoker     #Anemia  - Iron supplementation 4x/week  - improved iron indices with persistent microcytosis; possibility of alpha thal   - Repeat CBC and iron studies overall stable, last in 10/2023   [  ] continue to trend every ~6 months    #Osteoporosis   - patient defers treatment at this time  - continue to f/u; consider Rheum referral, has previously deferred bisphosphonates  - Vitamin D and Calcium supplementation recommended     Past Medical History: as above   Past Medical History:   Diagnosis Date    Acute upper respiratory infection, unspecified 11/01/2016    Viral upper respiratory infection    Bitten or stung by nonvenomous insect and other nonvenomous arthropods, initial encounter 12/01/2014    Nonvenomous insect bite of multiple sites    Disease of stomach and duodenum, unspecified 09/21/2013    Gastric and duodenal disease    Encounter for gynecological examination (general) (routine) without abnormal findings 11/17/2017    Visit for routine gyn exam    Encounter for screening for malignant neoplasm of rectum     Screening for cancer of the rectum    Headache, unspecified 10/31/2016    Sinus headache    Low back pain, unspecified 03/03/2016    Acute low back pain    Other conditions influencing health status 02/18/2017     History of cough    Other specified noninflammatory disorders of vagina 11/17/2017    Vaginal irritation    Other specified symptoms and signs involving the circulatory and respiratory systems 02/02/2017    Chest congestion    Personal history of other diseases of the musculoskeletal system and connective tissue 11/13/2020    History of osteoporosis    Personal history of other diseases of the respiratory system 02/02/2017    History of upper respiratory infection    Personal history of other diseases of the respiratory system 04/03/2017    History of acute sinusitis    Personal history of other medical treatment 11/17/2017    History of screening mammography    Urinary tract infection, site not specified 12/01/2014    Acute UTI     Subspecialty Medical Care: Pulm, Cardiology (as needed)    Past Surgical History:  lung lobectomy; PPM  Past Surgical History:   Procedure Laterality Date    ANKLE SURGERY  02/17/2014    Ankle Surgery    APPENDECTOMY  06/18/2014    Appendectomy    CARDIAC PACEMAKER PLACEMENT  06/22/2015    Pacemaker Placement    CT GUIDED PERCUTANEOUS BIOPSY LUNG  8/13/2018    CT GUIDED PERCUTANEOUS BIOPSY LUNG 8/13/2018 [facility] AIB LEGACY    FOOT SURGERY  06/22/2015    Foot Repair    LUNG LOBECTOMY  12/11/2018    Lung Lobectomy    MOUTH SURGERY  06/22/2015    Oral Surgery Tooth Extraction    TONSILLECTOMY  06/22/2015    Tonsillectomy     Medications:  Current Outpatient Medications:     acetaminophen 500 mg capsule, Take 1 capsule (500 mg) by mouth twice a day., Disp: , Rfl:     atorvastatin (Lipitor) 40 mg tablet, TAKE 1 TABLET BY MOUTH AT  BEDTIME, Disp: 90 tablet, Rfl: 0    calcium carbonate 600 mg calcium (1,500 mg) tablet, Take 1 tablet (1,500 mg) by mouth once daily., Disp: , Rfl:     carboxymethylcellulose (Refresh Plus) 0.5 % ophthalmic solution, Administer into affected eye(s)., Disp: , Rfl:     cholecalciferol (Vitamin D-3) 50 mcg (2,000 unit) capsule, Take by mouth., Disp: , Rfl:     ciclopirox  (Penlac) 8 % solution, Apply topically once daily at bedtime., Disp: 6.6 mL, Rfl: 1    clobetasol (Temovate) 0.05 % ointment, Apply topically 2 times a week. Apply a thin layer to the vulva, below the vaginal opening and onto the perineum, Disp: 30 g, Rfl: 1    ferrous sulfate 325 (65 Fe) MG tablet, Take by mouth., Disp: , Rfl:     ipratropium (Atrovent) 21 mcg (0.03 %) nasal spray, Administer into affected nostril(s)., Disp: , Rfl:     levothyroxine (Synthroid, Levoxyl) 75 mcg tablet, Take 1 tablet (75 mcg) by mouth once daily in the morning. Take before meals., Disp: 90 tablet, Rfl: 3    metoprolol succinate XL (Toprol-XL) 25 mg 24 hr tablet, TAKE 1 TABLET BY MOUTH DAILY, Disp: 90 tablet, Rfl: 0    metoprolol succinate XL (Toprol-XL) 50 mg 24 hr tablet, Take 1 tablet (50 mg) by mouth once daily. Along with 25 mg for total 75 mg daily., Disp: , Rfl:     olopatadine (Patanol) 0.1 % ophthalmic solution, 1 drop once daily. Into affected eyes as directed, Disp: , Rfl:     PARoxetine (Paxil) 20 mg tablet, TAKE 1 TABLET BY MOUTH DAILY, Disp: 90 tablet, Rfl: 3    rivaroxaban (Xarelto) 20 mg tablet, Take 1 tablet (20 mg) by mouth once daily in the evening. Take with meals., Disp: 90 tablet, Rfl: 3    tiZANidine (Zanaflex) 2 mg tablet, Take 1 tablet (2 mg) by mouth every 8 hours if needed for muscle spasms for up to 10 days., Disp: 30 tablet, Rfl: 0  Pharmacy: [facility]; [facility]; [phone]    Allergies:   Allergies   Allergen Reactions    Amiodarone Nausea Only     Immunizations: Flu 2023 done; updated COVID booster 2023 completed; RSV vaccine recommended     Family History:   - Brother passed away from PE, CLL   - Sister with Leiomyosarcoma (s/p hysterectomy); sister with asthma   - Mother with AML (age 40)  - Father with CVA (age 57)  - Son with GERD; VATS/Decortication s/p PNA     Family History   Problem Relation Name Age of Onset    Leukemia Mother      Leukemia Brother      Blood clot Brother      Heart  "attack Brother      Diabetes Mother's Sister      Other (CVA) Mother's Sister      Hyperlipidemia Father's Sister      Heart disease Father's Sister       Social History:   Home/Living Situation/Falls/Safety Assessment: lives at home alone; feels safe at home   Education/Employment/Work/Vocational: retired from    Activities: volunteering prior to the pandemic; avid reader; walking   Drug Use: prior smoker (50 years; up to pack a day); quit 10+ years ago   Diet: no dietary concerns   Depression/Anxiety: on SSRI   Sexuality/Contraception/Menstrual History:   Sleep: deep sleeper; feels she sleeps too much     Patient Information:  Health Insurance: has insurance   Transportation: [employer] POA/Guardian: Sister (Connie) [phone]  Contact Information: [phone]    Visit Vitals  /71   Pulse 74   Ht 1.59 m (5' 2.6\")   Wt 71.2 kg (157 lb)   SpO2 94%   BMI 28.17 kg/m²   OB Status Postmenopausal   Smoking Status Former   BSA 1.77 m²      PHYSICAL EXAM:   General:  female in NAD, well appearing   HEENT: NCAT, MMM  CV: RRR in office today   PULM: CTAB with diminished sounds at L base in setting of prior LLL lobectomy   ABD: soft, NT, ND, no rebound/guarding  : no suprapubic or CVA tenderness  EXT: WWP, no edema  MSK: no spinal point tenderness on exam   SKIN: no rashes noted  NEURO: A&Ox3, no gross motor or sensory deficits, ambulates without assistance, 2+ reflexes at patella bilaterally with preserved lower extremity strength and sensation, no ankle clonus   PSYCH: pleasant mood, appropriate affect     Assessment/Plan    Brittany Suarez is a 83 y.o. female seen in Clinic at [facility] by Dr. Avery Hare on 01/11/24 for routine care, as well as for management of the following chronic medical conditions: DLD, Tachy-Alan s/p PPM, HypoT, prior lung adenocarcinoma (diagnosed 2018, follows with pulm, Dr. Jimenez). She presents today for follow up of recurrent low back pain. She has been seen for this " "issue in the past with episode in 06/2023, at which time she was treated with steroid, low dose muscle relaxant, PT referral. Plain films obtained (given history of lung adeno) showed moderate facet arthropathy at L4-L5 and L5-S1, mild spondylosis, Grade 1 L4-L5 anterolisthesis. She states her pacemaker is NOT MRI compatible and symptoms initially improved after PT, thus advanced spinal imaging was not pursued. She believes she may have recently exacerbated her pain with some lifting of heavier than expected items. She has no focal weakness, preserved reflexes and no bowel/bladder incontinence. No point tenderness on spinal exam. No overlying skin changes/fevers/etc. Given she was provided relief with muscle relaxant in past, will again pursue. Can re-trial systemic oral steroids if needed but will defer empirically for now. Continue PT exercises as learned. If persistent/refractory, plan for pain management referral to discuss procedural interventions that may provide longer lasting relief.      #Low Back Pain, recurrent   - recurrent low dose muscle relaxant trial   - continue PT exercises   - plain films (lung adeno history, no concerning lytic lesions) from 06/2023 showed moderate facet arthropathy at L4-L5 and L5-S1, mild spondylosis, Grade 1 L4-L5 anterolisthesis  - per patient, has been told pacemaker is NOT MRI compatible  [  ] pain referral today if refractory pain/symptoms     #Chronic Sinusitis   - chronic issue for \"decades\" per patient  - Ipratropium nasal spray  - discussed intranasal corticosteroids as adjunct  [  ] ENT referral given prolonged duration of symptoms (decades) with poor symptom control at present and no prior workup     CHRONIC MEDICAL CONDITIONS:  #DLD  - Atorva 40  - LDL 90 per labs 07/2022  [  ] repeat lipids with next fasting labs     #Tachy-Alan Syndrome s/p pacemaker, PAFib  - AC   - Metoprolol 50+25mg   - Xarelto 20mg QHS     #HypoT  - Levothyroxine 75mcg dosing currently   - " TSH WNL 10/2023    #Depression   - Paroxetine 20mg daily     #HTN  - BP historically on lower side  - Metoprolol only medication as above     #Lung Cancer  - Lung adeno; diagnosed 07/2018 s/p LLL lobectomy in 09/2018  - Follows with Pulm, Dr. Jimenez; last imaging in January 2023 reassuring; follows annually  - Prior smoker     #Anemia  - Iron supplementation 4x/week  - improved iron indices with persistent microcytosis; possibility of alpha thal   - Repeat CBC and iron studies overall stable, last in 10/2023   [  ] continue to trend every ~6 months    #Osteoporosis   - patient defers treatment at this time  - continue to f/u; consider Rheum referral, has previously deferred bisphosphonates  - Vitamin D and Calcium supplementation recommended     #Health Maintenance    Cancer Screening  - Cervical Cancer Screening: last pap smear/HPV testing: further screening not recommended given age    - Mammography: due July 2023; recurrently ordered, last in 10/2023 and not yet completed   - Colorectal Cancer Screening: defers; last 2020   - Lung Cancer: prior history, follows with pulm annually     Laboratory Screening  - Lipid Screen: on statin, last labs 07/2022  - A1C, glucose screen: non-obese; reassuring CMP 01/2023  - STI, HIV, Hep B screen: defers  - Hep C screen: defers    Imaging Screening  - Osteoporosis/DEXA screening: Jan 2023, patient defers treatment at this time     Immunizations:   - Influenza: 2023 UTD   - COVID: fall 2023 UTD  - RSV: recommended   - Tdap: due 2027  - Prevnar, Pneumovax: UTD  - Shingrix: UTD     Other Screening  - Health Literacy Assessment: Appropriate   - Depression screen: NEGATIVE   - Home safety/partner violence screen: NEGATIVE   - Alcohol/tobacco/drug use screen: prior smoker   - Healthcare POA/Advanced Directives: Sister     Referrals: Pain Management, ENT     Patient Discussion:    Please call back the office with any questions at [phone]. In the case of an emergency, please call  911 or go to the nearest Emergency Department.     Avery Hare MD  Internal Medicine-Pediatrics  [facility] [address], [address]  P: [phone], F: [phone]

## 2024-01-16 ENCOUNTER — TELEPHONE (OUTPATIENT)
Dept: PRIMARY CARE | Facility: CLINIC | Age: 84
End: 2024-01-16

## 2024-01-16 DIAGNOSIS — H91.90 HEARING DIFFICULTY, UNSPECIFIED LATERALITY: Primary | ICD-10-CM

## 2024-01-24 ENCOUNTER — APPOINTMENT (OUTPATIENT)
Dept: CARDIOLOGY | Facility: CLINIC | Age: 84
End: 2024-01-24
Payer: MEDICARE

## 2024-01-24 LAB
ATRIAL RATE: 74 BPM
P OFFSET: 182 MS
P ONSET: 147 MS
PR INTERVAL: 146 MS
Q ONSET: 220 MS
QRS COUNT: 12 BEATS
QRS DURATION: 70 MS
QT INTERVAL: 370 MS
QTC CALCULATION(BAZETT): 410 MS
QTC FREDERICIA: 397 MS
R AXIS: 26 DEGREES
T AXIS: 13 DEGREES
T OFFSET: 405 MS
VENTRICULAR RATE: 74 BPM

## 2024-01-30 ENCOUNTER — APPOINTMENT (OUTPATIENT)
Dept: PULMONOLOGY | Facility: HOSPITAL | Age: 84
End: 2024-01-30
Payer: MEDICARE

## 2024-01-31 ENCOUNTER — OFFICE VISIT (OUTPATIENT)
Dept: PULMONOLOGY | Facility: HOSPITAL | Age: 84
End: 2024-01-31
Payer: MEDICARE

## 2024-01-31 VITALS
SYSTOLIC BLOOD PRESSURE: 120 MMHG | OXYGEN SATURATION: 98 % | TEMPERATURE: 97.6 F | DIASTOLIC BLOOD PRESSURE: 81 MMHG | HEART RATE: 83 BPM | BODY MASS INDEX: 28.71 KG/M2 | WEIGHT: 160 LBS

## 2024-01-31 DIAGNOSIS — C34.32 MALIGNANT NEOPLASM OF LOWER LOBE OF LEFT LUNG (MULTI): ICD-10-CM

## 2024-01-31 DIAGNOSIS — R93.89 ABNORMAL CHEST CT: Primary | ICD-10-CM

## 2024-01-31 PROCEDURE — 99213 OFFICE O/P EST LOW 20 MIN: CPT | Performed by: INTERNAL MEDICINE

## 2024-01-31 PROCEDURE — 1157F ADVNC CARE PLAN IN RCRD: CPT | Performed by: INTERNAL MEDICINE

## 2024-01-31 PROCEDURE — 3079F DIAST BP 80-89 MM HG: CPT | Performed by: INTERNAL MEDICINE

## 2024-01-31 PROCEDURE — 1126F AMNT PAIN NOTED NONE PRSNT: CPT | Performed by: INTERNAL MEDICINE

## 2024-01-31 PROCEDURE — 3074F SYST BP LT 130 MM HG: CPT | Performed by: INTERNAL MEDICINE

## 2024-01-31 PROCEDURE — 1160F RVW MEDS BY RX/DR IN RCRD: CPT | Performed by: INTERNAL MEDICINE

## 2024-01-31 PROCEDURE — 1159F MED LIST DOCD IN RCRD: CPT | Performed by: INTERNAL MEDICINE

## 2024-01-31 PROCEDURE — 1036F TOBACCO NON-USER: CPT | Performed by: INTERNAL MEDICINE

## 2024-01-31 SDOH — ECONOMIC STABILITY: FOOD INSECURITY: WITHIN THE PAST 12 MONTHS, YOU WORRIED THAT YOUR FOOD WOULD RUN OUT BEFORE YOU GOT MONEY TO BUY MORE.: NEVER TRUE

## 2024-01-31 SDOH — ECONOMIC STABILITY: FOOD INSECURITY: WITHIN THE PAST 12 MONTHS, THE FOOD YOU BOUGHT JUST DIDN'T LAST AND YOU DIDN'T HAVE MONEY TO GET MORE.: NEVER TRUE

## 2024-01-31 ASSESSMENT — PAIN SCALES - GENERAL: PAINLEVEL: 0-NO PAIN

## 2024-01-31 ASSESSMENT — PATIENT HEALTH QUESTIONNAIRE - PHQ9
SUM OF ALL RESPONSES TO PHQ9 QUESTIONS 1 & 2: 0
1. LITTLE INTEREST OR PLEASURE IN DOING THINGS: NOT AT ALL
2. FEELING DOWN, DEPRESSED OR HOPELESS: NOT AT ALL

## 2024-01-31 ASSESSMENT — LIFESTYLE VARIABLES: HOW OFTEN DO YOU HAVE A DRINK CONTAINING ALCOHOL: NEVER

## 2024-01-31 NOTE — PATIENT INSTRUCTIONS
Thank you for allowing me to participate in your health care today.    The primary respiratory problem that we discussed is   The lung nodules that you have on your CT scan of the chest are all unchanged in the past year.  There is no evidence of any recurrence of lung cancer  -Please contact your pharmacy to check if you had received the pneumonia vaccine namely PCV 20.  If not you are due for it  -Thank you for taking your flu shot as well as the RSV shot  -Follow-up in this clinic in 1 year's time.  We will repeat a CT scan of the chest then  -Please give me a call earlier if there is any respiratory issue  Please call [phone] ([facility]) to get your tests scheduled.     Please call [phone] to schedule a follow up appointment with me.    Unless deemed urgent or emergent, the result(s) of any tests ordering during this clinic visit will be reviewed during your follow-up visit. Depending on the urgency of the result(s), I may call or send you a [employer] message.

## 2024-02-01 ENCOUNTER — TELEPHONE (OUTPATIENT)
Dept: PRIMARY CARE | Facility: CLINIC | Age: 84
End: 2024-02-01

## 2024-02-01 ENCOUNTER — APPOINTMENT (OUTPATIENT)
Dept: RADIOLOGY | Facility: CLINIC | Age: 84
End: 2024-02-01
Payer: MEDICARE

## 2024-02-16 ENCOUNTER — TELEPHONE (OUTPATIENT)
Dept: OBSTETRICS AND GYNECOLOGY | Facility: CLINIC | Age: 84
End: 2024-02-16
Payer: MEDICARE

## 2024-02-20 ENCOUNTER — HOSPITAL ENCOUNTER (OUTPATIENT)
Dept: RADIOLOGY | Facility: CLINIC | Age: 84
Discharge: HOME | End: 2024-02-20
Payer: MEDICARE

## 2024-02-20 DIAGNOSIS — Z12.31 ENCOUNTER FOR SCREENING MAMMOGRAM FOR MALIGNANT NEOPLASM OF BREAST: ICD-10-CM

## 2024-02-20 PROCEDURE — 77063 BREAST TOMOSYNTHESIS BI: CPT | Performed by: RADIOLOGY

## 2024-02-20 PROCEDURE — 77067 SCR MAMMO BI INCL CAD: CPT | Performed by: RADIOLOGY

## 2024-02-20 PROCEDURE — 77067 SCR MAMMO BI INCL CAD: CPT

## 2024-02-21 NOTE — TELEPHONE ENCOUNTER
Pt returned call.  Pt verified by name and .  Pt calling for dx.  Pt aware it is lichen sclerosus.  Pt is aware Yosvany Ying would like her to be seen in 6 months.  Nurse scheduled pt for 224.

## 2024-03-04 ENCOUNTER — TELEPHONE (OUTPATIENT)
Dept: PULMONOLOGY | Facility: HOSPITAL | Age: 84
End: 2024-03-04
Payer: MEDICARE

## 2024-03-04 DIAGNOSIS — J98.8 RESPIRATORY INFECTION: ICD-10-CM

## 2024-03-04 PROCEDURE — 87205 SMEAR GRAM STAIN: CPT

## 2024-03-04 PROCEDURE — 87070 CULTURE OTHR SPECIMN AEROBIC: CPT

## 2024-03-04 PROCEDURE — 87075 CULTR BACTERIA EXCEPT BLOOD: CPT

## 2024-03-04 NOTE — TELEPHONE ENCOUNTER
02 with activity no oxygen-84%   Returned pt's voicemail regarding her not feeling well. Pt is c/o cough with thick green sputum. She stated that it started last night. She denies wheezing, sob, fever, chills, and chest tightness. She wants to try and catch this early before she gets worse. Dr. Jimenez was notified. Per Dr. Jimenez, pt will need a sputum culture, influenza test and azithromycin x 5 days. Orders placed and azithromycin routed to Dr. Jimenez to sign. Spoke with pt and updated her on plan of care. She verbalized understanding and verified pharmacy. Pt stated that she is familiar with obtaining a sputum culture. She was instructed to not take the antibiotic until after she gets the sputum sample. She verbalized understanding.

## 2024-03-05 RX ORDER — AZITHROMYCIN 250 MG/1
250 TABLET, FILM COATED ORAL DAILY
Qty: 5 TABLET | Refills: 0 | Status: SHIPPED | OUTPATIENT
Start: 2024-03-05 | End: 2024-03-10

## 2024-03-06 ENCOUNTER — LAB REQUISITION (OUTPATIENT)
Dept: LAB | Facility: HOSPITAL | Age: 84
End: 2024-03-06
Payer: MEDICARE

## 2024-03-06 DIAGNOSIS — J98.8 OTHER SPECIFIED RESPIRATORY DISORDERS: ICD-10-CM

## 2024-03-07 ENCOUNTER — TELEPHONE (OUTPATIENT)
Dept: OBSTETRICS AND GYNECOLOGY | Facility: CLINIC | Age: 84
End: 2024-03-07
Payer: MEDICARE

## 2024-03-08 LAB
BACTERIA SPEC RESP CULT: NORMAL
GRAM STN SPEC: NORMAL
GRAM STN SPEC: NORMAL

## 2024-03-09 ENCOUNTER — TELEPHONE (OUTPATIENT)
Dept: PULMONOLOGY | Facility: HOSPITAL | Age: 84
End: 2024-03-09
Payer: MEDICARE

## 2024-03-09 DIAGNOSIS — J34.89 RHINORRHEA: Primary | ICD-10-CM

## 2024-03-09 RX ORDER — PHENYLEPHRINE HCL 10 MG/1
10 TABLET, FILM COATED ORAL EVERY 6 HOURS PRN
Qty: 30 TABLET | Refills: 0 | Status: SHIPPED | OUTPATIENT
Start: 2024-03-09 | End: 2024-03-16

## 2024-03-09 NOTE — TELEPHONE ENCOUNTER
Pt called with sputum production green -> now cloudy white after starting Azithromycin. Has chronic rhinorrhea which is worsening and thin in nature. Pain concern is persistent now dry cough she is concerned is coming from her rhinorrhea.     Sudaphed for nasal congestion sent to preferred pharmacy. Pt to call with any worsening or new symptoms.

## 2024-03-11 DIAGNOSIS — E78.00 HYPERCHOLESTEROLEMIA: ICD-10-CM

## 2024-03-11 RX ORDER — ATORVASTATIN CALCIUM 40 MG/1
40 TABLET, FILM COATED ORAL NIGHTLY
Qty: 90 TABLET | Refills: 3 | Status: SHIPPED | OUTPATIENT
Start: 2024-03-11

## 2024-03-14 ENCOUNTER — TELEPHONE (OUTPATIENT)
Dept: PULMONOLOGY | Facility: HOSPITAL | Age: 84
End: 2024-03-14
Payer: MEDICARE

## 2024-03-14 NOTE — TELEPHONE ENCOUNTER
Spoke to pt regarding a follow up on not feeling well. Pt states that she is feeling much better. She stated that she still has cough, but comes up periodically. She was appreciative of the call.

## 2024-03-15 NOTE — PROGRESS NOTES
Department of Medicine  Division of Pulmonary, Critical Care, and Sleep Medicine  Follow-Up Visit    Date of visit: 01/31/2024  Patient: Brittany Suarez    MRN: [id number]  YOB: 1940   Gender: female  ========================================================================  Reason for this visit  Brittany Suarez is a 83 y.o. female former smoker who presents today for follow-up on  lung cancer  ========================================================================  IMPRESSION and RECOMMENDATIONS  Ms. Suarez is a 83 y.o. female with the following respiratory problems  --- Lung cancer diagnosed in July 2018 - underwent left lower lobectomy in September 2018. The tumor size was 2.5 cm it did invade the visceral pleura. None of the lymph nodes were involved and there was no distant metastases. This puts her in a clinical stage of IIA given the visceral pleural involvement   > Currently she is cancer free   > Repeated CT scan of the chest is without any suggestion of recurrence   > Plan to monitor over the next year as well.    --- Several areas of small groundglass/nodule changes in the right lung of nonspecific nature. The rate of change as well as occurrence is not suggestive of an active infection. With her prior history of lung cancer it would be reasonable to continue to monitor.    of note - spirometry from last July was normal  ========================================================================  Physician HPI (3/14/2024):  Ms. Suarez is a 83 y.o. female known with history of lung cancer S/P resection of LLL . Has been followed in this clinic since 2018 without any evidence of recurrence. Here today for FU  Since last visit . No hospitalizations, ED visits, cardiac or respiratory difficulties  From a respiratory stand point    dyspnea mMRC Grade 0  Cough none  chest tightness and  wheezing are not reported  Denies fever or chills, night sweats, weight loss or gain  Denies problems swallowing or  chocking on food or cough when eating, or reflux.   no chest pain, orthopnea, PND or LE edema  Denies tight skin, Raynaud's, joint pain or swelling. No rashes or ulcers.   Chest CT April 2023 is with  1.  Stable postoperative changes of left lower lobectomy without evidence of locoregional disease recurrence.  2. Unchanged size of scattered pulmonary nodules throughout the right lung as described above and compared with exams dating back to 09/07/2021. No new or enlarging discrete suspicious pulmonary nodules.  3. Unchanged size of a chronic small left pleural effusion - this is likely thickening post operatively  4.  Severe coronary artery calcifications, indicating the presence of coronary artery disease  ========================================================================  Physical Examination:  /81 (BP Location: Left arm, Patient Position: Sitting)   Pulse 83   Temp 36.4 °C (97.6 °F) (Temporal)   Wt 72.6 kg (160 lb)   SpO2 98% Comment: RA  BMI 28.71 kg/m²  Body mass index is 28.71 kg/m².  GENERAL: normal appearance. well nourished. No respiratory distress  HEAD/SINUSES: no sinus tenderness  OROPHARYNX: Moist mucosa, no thrush  - Mallampati II (hard and soft palate, upper portion of tonsils anduvula visible)  NECK: no JVD, midline trachea without stridor. Thyroid not enlarged  LYMPH NODES : none felt in the cervical, submandibular or supraclavicular regions  LUNGS: Symmetric chest. Good excursion. Equal breath sounds. no wheezing. no crackles or rhonchi  CARDIAC: normal S1 and S2; no gallops, rubs or murmurs. Regular rate and rhythm  EXTREMITIES: No edema, no varicose veins  NEURO: grossly normal mental status, CN reflexes and motor strength.   SKIN: Skin turgor normal. No rashes or lesions.   PSYCH: Normal affect  ========================================================================  Current Medications:  Current Outpatient Medications   Medication Instructions    acetaminophen 500 mg capsule 1  capsule, oral, 2 times daily    atorvastatin (LIPITOR) 40 mg, oral, Nightly    calcium carbonate 600 mg calcium (1,500 mg) tablet 1 tablet, oral, Daily    carboxymethylcellulose (Refresh Plus) 0.5 % ophthalmic solution ophthalmic (eye)    cholecalciferol (Vitamin D-3) 50 mcg (2,000 unit) capsule oral    ciclopirox (Penlac) 8 % solution Topical, Nightly    clobetasol (Temovate) 0.05 % ointment Topical, 2 times weekly, Apply a thin layer to the vulva, below the vaginal opening and onto the perineum    ferrous sulfate 325 (65 Fe) MG tablet oral    ipratropium (Atrovent) 21 mcg (0.03 %) nasal spray nasal    levothyroxine (SYNTHROID, LEVOXYL) 75 mcg, oral, Daily before breakfast    metoprolol succinate XL (Toprol-XL) 50 mg 24 hr tablet Take 1 tablet (50 mg) by mouth once daily. Along with 25 mg for total 75 mg daily.    metoprolol succinate XL (TOPROL-XL) 25 mg, oral, Daily    olopatadine (Patanol) 0.1 % ophthalmic solution 1 drop, Daily, Into affected eyes as directed    PARoxetine (Paxil) 20 mg tablet TAKE 1 TABLET BY MOUTH DAILY    phenylephrine (SUDAFED PE) 10 mg, oral, Every 6 hours PRN    rivaroxaban (XARELTO) 20 mg, oral, Daily with evening meal    tiZANidine (ZANAFLEX) 2 mg, oral, Every 8 hours PRN    ========================================================================  Drug Allergies/Intolerances:  Allergies   Allergen Reactions    Amiodarone Nausea Only    Disclosure: Parts of this note may have been scribed or generated using voice dictation software, Dragon.  Homophonic errors may exist.  Please contact me directly if clarification is needed  Conrad Jimenez MD  01/31/2024

## 2024-03-19 ENCOUNTER — TELEPHONE (OUTPATIENT)
Dept: PULMONOLOGY | Facility: HOSPITAL | Age: 84
End: 2024-03-19
Payer: MEDICARE

## 2024-03-19 DIAGNOSIS — C34.32 MALIGNANT NEOPLASM OF LOWER LOBE OF LEFT LUNG (MULTI): ICD-10-CM

## 2024-03-19 RX ORDER — IPRATROPIUM BROMIDE 21 UG/1
1 SPRAY, METERED NASAL 2 TIMES DAILY
Qty: 30 ML | Refills: 3 | Status: SHIPPED | OUTPATIENT
Start: 2024-03-19

## 2024-03-19 NOTE — TELEPHONE ENCOUNTER
Pt called needing a refill for her nasal spray. Refill order placed and routed to Dr. Jimenez to sign. Pt verified pharmacy.

## 2024-04-04 ENCOUNTER — TELEPHONE (OUTPATIENT)
Dept: SCHEDULING | Age: 84
End: 2024-04-04
Payer: MEDICARE

## 2024-04-04 NOTE — TELEPHONE ENCOUNTER
Dental implant.  Okay to stop the anticoagulation 48 hours prior to the procedure, resume the evening following the procedure.  Confirmed with patient by phone.

## 2024-04-16 ENCOUNTER — APPOINTMENT (OUTPATIENT)
Dept: OBSTETRICS AND GYNECOLOGY | Facility: CLINIC | Age: 84
End: 2024-04-16
Payer: MEDICARE

## 2024-04-19 DIAGNOSIS — E03.8 OTHER SPECIFIED HYPOTHYROIDISM: ICD-10-CM

## 2024-04-19 RX ORDER — LEVOTHYROXINE SODIUM 75 UG/1
TABLET ORAL
Qty: 90 TABLET | Refills: 3 | Status: SHIPPED | OUTPATIENT
Start: 2024-04-19

## 2024-06-04 ENCOUNTER — OFFICE VISIT (OUTPATIENT)
Dept: OBSTETRICS AND GYNECOLOGY | Facility: CLINIC | Age: 84
End: 2024-06-04
Payer: MEDICARE

## 2024-06-04 VITALS
SYSTOLIC BLOOD PRESSURE: 110 MMHG | DIASTOLIC BLOOD PRESSURE: 62 MMHG | WEIGHT: 155.2 LBS | HEIGHT: 63 IN | BODY MASS INDEX: 27.5 KG/M2

## 2024-06-04 DIAGNOSIS — L90.0 LICHEN SCLEROSUS: Primary | ICD-10-CM

## 2024-06-04 PROCEDURE — 3078F DIAST BP <80 MM HG: CPT | Performed by: NURSE PRACTITIONER

## 2024-06-04 PROCEDURE — 1036F TOBACCO NON-USER: CPT | Performed by: NURSE PRACTITIONER

## 2024-06-04 PROCEDURE — 1126F AMNT PAIN NOTED NONE PRSNT: CPT | Performed by: NURSE PRACTITIONER

## 2024-06-04 PROCEDURE — 1157F ADVNC CARE PLAN IN RCRD: CPT | Performed by: NURSE PRACTITIONER

## 2024-06-04 PROCEDURE — 99213 OFFICE O/P EST LOW 20 MIN: CPT | Performed by: NURSE PRACTITIONER

## 2024-06-04 PROCEDURE — 1159F MED LIST DOCD IN RCRD: CPT | Performed by: NURSE PRACTITIONER

## 2024-06-04 PROCEDURE — 3074F SYST BP LT 130 MM HG: CPT | Performed by: NURSE PRACTITIONER

## 2024-06-04 RX ORDER — CLOBETASOL PROPIONATE 0.5 MG/G
OINTMENT TOPICAL 2 TIMES WEEKLY
Qty: 30 G | Refills: 1 | Status: SHIPPED | OUTPATIENT
Start: 2024-06-06

## 2024-06-04 ASSESSMENT — ENCOUNTER SYMPTOMS
HEMATOLOGIC/LYMPHATIC NEGATIVE: 0
PSYCHIATRIC NEGATIVE: 0
MUSCULOSKELETAL NEGATIVE: 0
EYES NEGATIVE: 0
CARDIOVASCULAR NEGATIVE: 0
GASTROINTESTINAL NEGATIVE: 0
ALLERGIC/IMMUNOLOGIC NEGATIVE: 0
NEUROLOGICAL NEGATIVE: 0
ENDOCRINE NEGATIVE: 0
CONSTITUTIONAL NEGATIVE: 0
RESPIRATORY NEGATIVE: 0

## 2024-06-04 ASSESSMENT — PAIN SCALES - GENERAL: PAINLEVEL: 0-NO PAIN

## 2024-06-04 NOTE — PROGRESS NOTES
Subjective   Patient ID: Brittany Suarez is a 83 y.o. female who presents for Follow-up.  HPI  Follow up from 10/2024  H/o LS  Applies clobetasol twice weekly; no pruritus or pain  Review of Systems    Objective   Physical Exam  Chest:   Breasts:     Right: Normal.      Left: Normal.      Comments: Breast exam per pt request  Genitourinary:     Comments: Clitoral phimosis, clitoral glans not visible  Labia minora bilaterally 100% reabsorbed  No active LS, no lesions  Vaginal narrowing noted        Assessment/Plan   Diagnoses and all orders for this visit:  Lichen sclerosus  -     clobetasol (Temovate) 0.05 % ointment; Apply topically 2 times a week. Apply a thin layer to the vulva, below the vaginal opening and onto the perineum       Follow up in 6 months or sooner with any bothersome symptoms    JIMMY Hanson-CNP 06/04/24 2:28 PM

## 2024-06-10 ENCOUNTER — TELEPHONE (OUTPATIENT)
Dept: OBSTETRICS AND GYNECOLOGY | Facility: CLINIC | Age: 84
End: 2024-06-10
Payer: MEDICARE

## 2024-06-10 NOTE — TELEPHONE ENCOUNTER
Contacted pt and verified name and .  Pt notified Yosvany wrote for the order or meds probably to cover her and make sure she doesn't run out.  Pt states understanding and denies having questions at this time.

## 2024-06-15 ENCOUNTER — PATIENT MESSAGE (OUTPATIENT)
Dept: CARDIOLOGY | Facility: CLINIC | Age: 84
End: 2024-06-15
Payer: MEDICARE

## 2024-06-15 DIAGNOSIS — I48.0 PAROXYSMAL ATRIAL FIBRILLATION (MULTI): ICD-10-CM

## 2024-06-17 ENCOUNTER — TELEPHONE (OUTPATIENT)
Dept: SCHEDULING | Age: 84
End: 2024-06-17
Payer: MEDICARE

## 2024-06-17 DIAGNOSIS — I48.91 ATRIAL FIBRILLATION AND FLUTTER (MULTI): Primary | ICD-10-CM

## 2024-06-17 DIAGNOSIS — I48.92 ATRIAL FIBRILLATION AND FLUTTER (MULTI): Primary | ICD-10-CM

## 2024-06-17 RX ORDER — METOPROLOL SUCCINATE 50 MG/1
TABLET, EXTENDED RELEASE ORAL
Qty: 90 TABLET | Refills: 3 | Status: SHIPPED | OUTPATIENT
Start: 2024-06-17

## 2024-06-17 NOTE — TELEPHONE ENCOUNTER
Called and spoke to patient. Patient will take 3 of the 25mg tablets until prescription arrives from [facility].

## 2024-06-22 DIAGNOSIS — F32.A DEPRESSION, UNSPECIFIED DEPRESSION TYPE: ICD-10-CM

## 2024-06-22 DIAGNOSIS — C34.32 MALIGNANT NEOPLASM OF LOWER LOBE OF LEFT LUNG (MULTI): ICD-10-CM

## 2024-06-22 DIAGNOSIS — I48.91 ATRIAL FIBRILLATION AND FLUTTER (MULTI): ICD-10-CM

## 2024-06-22 DIAGNOSIS — L90.0 LICHEN SCLEROSUS: ICD-10-CM

## 2024-06-22 DIAGNOSIS — I48.92 ATRIAL FIBRILLATION AND FLUTTER (MULTI): ICD-10-CM

## 2024-06-23 RX ORDER — PAROXETINE HYDROCHLORIDE 20 MG/1
TABLET, FILM COATED ORAL
Qty: 90 TABLET | Refills: 3 | Status: SHIPPED | OUTPATIENT
Start: 2024-06-23

## 2024-06-24 DIAGNOSIS — I48.91 ATRIAL FIBRILLATION AND FLUTTER (MULTI): ICD-10-CM

## 2024-06-24 DIAGNOSIS — I48.92 ATRIAL FIBRILLATION AND FLUTTER (MULTI): ICD-10-CM

## 2024-06-24 RX ORDER — IPRATROPIUM BROMIDE 21 UG/1
1 SPRAY, METERED NASAL 2 TIMES DAILY
Qty: 60 ML | Refills: 2 | Status: SHIPPED | OUTPATIENT
Start: 2024-06-24

## 2024-06-24 RX ORDER — METOPROLOL SUCCINATE 25 MG/1
25 TABLET, EXTENDED RELEASE ORAL DAILY
Qty: 100 TABLET | Refills: 3 | Status: SHIPPED | OUTPATIENT
Start: 2024-06-24

## 2024-06-26 DIAGNOSIS — C34.32 MALIGNANT NEOPLASM OF LOWER LOBE OF LEFT LUNG (MULTI): ICD-10-CM

## 2024-06-27 RX ORDER — CLOBETASOL PROPIONATE 0.5 MG/G
OINTMENT TOPICAL
Qty: 60 G | OUTPATIENT
Start: 2024-06-27

## 2024-06-27 RX ORDER — IPRATROPIUM BROMIDE 42 UG/1
2 SPRAY, METERED NASAL 4 TIMES DAILY
Qty: 15 ML | Refills: 12 | Status: SHIPPED | OUTPATIENT
Start: 2024-06-27

## 2024-06-27 NOTE — PROGRESS NOTES
Pt reached out via [employer] message for Ipratropium 0.06 nasal spray for rhinitis. Order placed and routed to Dr. Jimenez to sign. Pt verified pharmacy.

## 2024-07-09 ENCOUNTER — LAB (OUTPATIENT)
Dept: LAB | Facility: LAB | Age: 84
End: 2024-07-09
Payer: MEDICARE

## 2024-07-09 ENCOUNTER — APPOINTMENT (OUTPATIENT)
Dept: PRIMARY CARE | Facility: CLINIC | Age: 84
End: 2024-07-09
Payer: MEDICARE

## 2024-07-09 VITALS
HEART RATE: 77 BPM | OXYGEN SATURATION: 94 % | SYSTOLIC BLOOD PRESSURE: 104 MMHG | BODY MASS INDEX: 27.43 KG/M2 | WEIGHT: 154.8 LBS | DIASTOLIC BLOOD PRESSURE: 64 MMHG | HEIGHT: 63 IN

## 2024-07-09 DIAGNOSIS — Z00.00 MEDICARE ANNUAL WELLNESS VISIT, SUBSEQUENT: Primary | ICD-10-CM

## 2024-07-09 DIAGNOSIS — D50.9 IRON DEFICIENCY ANEMIA, UNSPECIFIED IRON DEFICIENCY ANEMIA TYPE: ICD-10-CM

## 2024-07-09 DIAGNOSIS — E03.8 OTHER SPECIFIED HYPOTHYROIDISM: ICD-10-CM

## 2024-07-09 DIAGNOSIS — E55.9 MILD VITAMIN D DEFICIENCY: ICD-10-CM

## 2024-07-09 DIAGNOSIS — E78.5 DYSLIPIDEMIA: ICD-10-CM

## 2024-07-09 DIAGNOSIS — I10 BENIGN ESSENTIAL HYPERTENSION: ICD-10-CM

## 2024-07-09 PROBLEM — H02.059 TRICHIASIS: Status: ACTIVE | Noted: 2024-07-09

## 2024-07-09 PROBLEM — H00.019 HORDEOLUM EXTERNUM OF LOWER EYELID: Status: ACTIVE | Noted: 2023-09-21

## 2024-07-09 PROBLEM — J32.0 CHRONIC MAXILLARY SINUSITIS: Status: ACTIVE | Noted: 2024-07-09

## 2024-07-09 PROBLEM — H35.363 RETINAL DRUSEN OF BOTH EYES: Status: ACTIVE | Noted: 2024-02-07

## 2024-07-09 PROBLEM — K11.7 XEROSTOMIA: Status: ACTIVE | Noted: 2023-09-21

## 2024-07-09 PROBLEM — Z96.1 PSEUDOPHAKIA OF BOTH EYES: Status: ACTIVE | Noted: 2024-03-01

## 2024-07-09 PROBLEM — B34.8 RHINOVIRUS INFECTION: Status: ACTIVE | Noted: 2023-09-21

## 2024-07-09 PROBLEM — M85.80 OSTEOPENIA: Status: ACTIVE | Noted: 2023-01-17

## 2024-07-09 PROBLEM — R91.8 INFILTRATE OF LUNG PRESENT ON IMAGING STUDY: Status: ACTIVE | Noted: 2023-09-21

## 2024-07-09 PROBLEM — M79.606 PAIN OF LOWER EXTREMITY: Status: ACTIVE | Noted: 2023-09-21

## 2024-07-09 LAB
25(OH)D3 SERPL-MCNC: 62 NG/ML (ref 30–100)
ALBUMIN SERPL BCP-MCNC: 4.4 G/DL (ref 3.4–5)
ALP SERPL-CCNC: 64 U/L (ref 33–136)
ALT SERPL W P-5'-P-CCNC: 13 U/L (ref 7–45)
ANION GAP SERPL CALC-SCNC: 14 MMOL/L (ref 10–20)
AST SERPL W P-5'-P-CCNC: 17 U/L (ref 9–39)
BILIRUB SERPL-MCNC: 0.5 MG/DL (ref 0–1.2)
BUN SERPL-MCNC: 18 MG/DL (ref 6–23)
CALCIUM SERPL-MCNC: 9.9 MG/DL (ref 8.6–10.6)
CHLORIDE SERPL-SCNC: 103 MMOL/L (ref 98–107)
CHOLEST SERPL-MCNC: 160 MG/DL (ref 0–199)
CHOLESTEROL/HDL RATIO: 2.8
CO2 SERPL-SCNC: 29 MMOL/L (ref 21–32)
CREAT SERPL-MCNC: 0.78 MG/DL (ref 0.5–1.05)
EGFRCR SERPLBLD CKD-EPI 2021: 76 ML/MIN/1.73M*2
ERYTHROCYTE [DISTWIDTH] IN BLOOD BY AUTOMATED COUNT: 15.3 % (ref 11.5–14.5)
GLUCOSE SERPL-MCNC: 91 MG/DL (ref 74–99)
HCT VFR BLD AUTO: 37.6 % (ref 36–46)
HDLC SERPL-MCNC: 56.4 MG/DL
HGB BLD-MCNC: 11.5 G/DL (ref 12–16)
LDLC SERPL CALC-MCNC: 84 MG/DL
MCH RBC QN AUTO: 22.5 PG (ref 26–34)
MCHC RBC AUTO-ENTMCNC: 30.6 G/DL (ref 32–36)
MCV RBC AUTO: 74 FL (ref 80–100)
NON HDL CHOLESTEROL: 104 MG/DL (ref 0–149)
NRBC BLD-RTO: 0 /100 WBCS (ref 0–0)
PLATELET # BLD AUTO: 223 X10*3/UL (ref 150–450)
POTASSIUM SERPL-SCNC: 4.7 MMOL/L (ref 3.5–5.3)
PROT SERPL-MCNC: 6.8 G/DL (ref 6.4–8.2)
RBC # BLD AUTO: 5.11 X10*6/UL (ref 4–5.2)
SODIUM SERPL-SCNC: 141 MMOL/L (ref 136–145)
TRIGL SERPL-MCNC: 98 MG/DL (ref 0–149)
TSH SERPL-ACNC: 0.58 MIU/L (ref 0.44–3.98)
VLDL: 20 MG/DL (ref 0–40)
WBC # BLD AUTO: 4.9 X10*3/UL (ref 4.4–11.3)

## 2024-07-09 PROCEDURE — G0439 PPPS, SUBSEQ VISIT: HCPCS | Performed by: STUDENT IN AN ORGANIZED HEALTH CARE EDUCATION/TRAINING PROGRAM

## 2024-07-09 PROCEDURE — 80061 LIPID PANEL: CPT

## 2024-07-09 PROCEDURE — 3078F DIAST BP <80 MM HG: CPT | Performed by: STUDENT IN AN ORGANIZED HEALTH CARE EDUCATION/TRAINING PROGRAM

## 2024-07-09 PROCEDURE — 36415 COLL VENOUS BLD VENIPUNCTURE: CPT

## 2024-07-09 PROCEDURE — 84443 ASSAY THYROID STIM HORMONE: CPT

## 2024-07-09 PROCEDURE — 1170F FXNL STATUS ASSESSED: CPT | Performed by: STUDENT IN AN ORGANIZED HEALTH CARE EDUCATION/TRAINING PROGRAM

## 2024-07-09 PROCEDURE — 80053 COMPREHEN METABOLIC PANEL: CPT

## 2024-07-09 PROCEDURE — 1157F ADVNC CARE PLAN IN RCRD: CPT | Performed by: STUDENT IN AN ORGANIZED HEALTH CARE EDUCATION/TRAINING PROGRAM

## 2024-07-09 PROCEDURE — 1159F MED LIST DOCD IN RCRD: CPT | Performed by: STUDENT IN AN ORGANIZED HEALTH CARE EDUCATION/TRAINING PROGRAM

## 2024-07-09 PROCEDURE — 3074F SYST BP LT 130 MM HG: CPT | Performed by: STUDENT IN AN ORGANIZED HEALTH CARE EDUCATION/TRAINING PROGRAM

## 2024-07-09 PROCEDURE — 82306 VITAMIN D 25 HYDROXY: CPT

## 2024-07-09 PROCEDURE — 1123F ACP DISCUSS/DSCN MKR DOCD: CPT | Performed by: STUDENT IN AN ORGANIZED HEALTH CARE EDUCATION/TRAINING PROGRAM

## 2024-07-09 PROCEDURE — 99214 OFFICE O/P EST MOD 30 MIN: CPT | Performed by: STUDENT IN AN ORGANIZED HEALTH CARE EDUCATION/TRAINING PROGRAM

## 2024-07-09 PROCEDURE — 85027 COMPLETE CBC AUTOMATED: CPT

## 2024-07-09 NOTE — PROGRESS NOTES
"Brittany Suarez is a 83 y.o. female seen in Clinic at [facility] by Dr. Avery Hare on 07/09/24 for routine care, as well as for management of the following chronic medical conditions: DLD, Tachy-Alan s/p PPM, HypoT, prior lung adenocarcinoma (diagnosed 2018, follows with pulm, Dr. Jimenez), low back pain. She presents today for CPE/MCW visit.       CHRONIC MEDICAL CONDITIONS:  #DLD  - Atorva 40  - LDL 84 per labs 07/2024    #Tachy-Alan Syndrome s/p pacemaker, PAFib  - AC   - Metoprolol 50+25mg   - Xarelto 20mg QHS     #HypoT  - Levothyroxine 75mcg dosing currently   - TSH WNL 07/2024    #Depression   - Paroxetine 20mg daily     #HTN  - BP historically on lower side  - Metoprolol only medication as above     #Lung Cancer  - Lung adeno; diagnosed 07/2018 s/p LLL lobectomy in 09/2018  - Follows with Pulm, Dr. Jimenez; last imaging in January 2023 reassuring  - last seen 01/2024 with plans for ongoing surveillance   - Prior smoker   [  ] next imaging due 01/2025    #Anemia  - Iron supplementation 4x/week  - improved iron indices with persistent microcytosis; possibility of alpha thal   - Repeat CBC and iron studies overall stable, last in 10/2023   [x] continue to trend every ~6 months: repeat labs today with Hgb 11.5 (from 11.2)    #Osteoporosis   - patient defers treatment at this time  - continue to f/u; consider Rheum referral, has previously deferred bisphosphonates  - Vitamin D and Calcium supplementation recommended     #Low Back Pain, recurrent   - recurrent low dose muscle relaxant trial   - continue PT exercises   - plain films (lung adeno history, no concerning lytic lesions) from 06/2023 showed moderate facet arthropathy at L4-L5 and L5-S1, mild spondylosis, Grade 1 L4-L5 anterolisthesis  - per patient, has been told pacemaker is NOT MRI compatible    #Chronic Sinusitis   - chronic issue for \"decades\" per patient  - Ipratropium nasal spray  - discussed intranasal corticosteroids as adjunct  - prior ENT referral "     Past Medical History: as above   Past Medical History:   Diagnosis Date    Acute upper respiratory infection, unspecified 11/01/2016    Viral upper respiratory infection    Bitten or stung by nonvenomous insect and other nonvenomous arthropods, initial encounter 12/01/2014    Nonvenomous insect bite of multiple sites    Disease of stomach and duodenum, unspecified 09/21/2013    Gastric and duodenal disease    Encounter for gynecological examination (general) (routine) without abnormal findings 11/17/2017    Visit for routine gyn exam    Encounter for screening for malignant neoplasm of rectum     Screening for cancer of the rectum    Headache, unspecified 10/31/2016    Sinus headache    Low back pain, unspecified 03/03/2016    Acute low back pain    Other conditions influencing health status 02/18/2017    History of cough    Other specified noninflammatory disorders of vagina 11/17/2017    Vaginal irritation    Other specified symptoms and signs involving the circulatory and respiratory systems 02/02/2017    Chest congestion    Personal history of other diseases of the musculoskeletal system and connective tissue 11/13/2020    History of osteoporosis    Personal history of other diseases of the respiratory system 02/02/2017    History of upper respiratory infection    Personal history of other diseases of the respiratory system 04/03/2017    History of acute sinusitis    Personal history of other medical treatment 11/17/2017    History of screening mammography    Urinary tract infection, site not specified 12/01/2014    Acute UTI     Subspecialty Medical Care: Pulm, Cardiology (as needed)    Past Surgical History:  lung lobectomy; PPM  Past Surgical History:   Procedure Laterality Date    ANKLE SURGERY  02/17/2014    Ankle Surgery    APPENDECTOMY  06/18/2014    Appendectomy    CARDIAC PACEMAKER PLACEMENT  06/22/2015    Pacemaker Placement    CT GUIDED PERCUTANEOUS BIOPSY LUNG  8/13/2018    CT GUIDED PERCUTANEOUS  BIOPSY LUNG 8/13/2018 [facility]    FOOT SURGERY  06/22/2015    Foot Repair    LUNG LOBECTOMY  12/11/2018    Lung Lobectomy    MOUTH SURGERY  06/22/2015    Oral Surgery Tooth Extraction    TONSILLECTOMY  06/22/2015    Tonsillectomy     Medications:  Current Outpatient Medications:     acetaminophen 500 mg capsule, Take 1 capsule (500 mg) by mouth twice a day., Disp: , Rfl:     atorvastatin (Lipitor) 40 mg tablet, TAKE 1 TABLET BY MOUTH AT  BEDTIME, Disp: 90 tablet, Rfl: 3    calcium carbonate 600 mg calcium (1,500 mg) tablet, Take 1 tablet (1,500 mg) by mouth once daily., Disp: , Rfl:     carboxymethylcellulose (Refresh Plus) 0.5 % ophthalmic solution, Administer into affected eye(s)., Disp: , Rfl:     cholecalciferol (Vitamin D-3) 50 mcg (2,000 unit) capsule, Take by mouth., Disp: , Rfl:     clobetasol (Temovate) 0.05 % ointment, Apply topically 2 times a week. Apply a thin layer to the vulva, below the vaginal opening and onto the perineum, Disp: 30 g, Rfl: 1    ferrous sulfate 325 (65 Fe) MG tablet, Take by mouth., Disp: , Rfl:     ipratropium (Atrovent) 21 mcg (0.03 %) nasal spray, USE 1 SPRAY IN BOTH NOSTRILS  TWICE DAILY, Disp: 60 mL, Rfl: 2    ipratropium (Atrovent) 42 mcg (0.06 %) nasal spray, Administer 2 sprays into each nostril 4 times a day., Disp: 15 mL, Rfl: 12    levothyroxine (Synthroid, Levoxyl) 75 mcg tablet, TAKE 1 TABLET BY MOUTH DAILY IN  THE MORNING BEFORE MEAL, Disp: 90 tablet, Rfl: 3    metoprolol succinate XL (Toprol-XL) 25 mg 24 hr tablet, TAKE 1 TABLET BY MOUTH DAILY, Disp: 100 tablet, Rfl: 3    olopatadine (Patanol) 0.1 % ophthalmic solution, 1 drop once daily. Into affected eyes as directed, Disp: , Rfl:     PARoxetine (Paxil) 20 mg tablet, TAKE 1 TABLET BY MOUTH DAILY, Disp: 90 tablet, Rfl: 3    rivaroxaban (Xarelto) 20 mg tablet, Take 1 tablet (20 mg) by mouth once daily in the evening. Take with meals., Disp: 90 tablet, Rfl: 3  Pharmacy: [facility]; [facility];  "[phone]    Allergies:   Allergies   Allergen Reactions    Amiodarone Nausea Only, GI intolerance and Nausea/vomiting     Immunizations:   - Flu annually advised  - updated COVID booster recommended  - RSV advised  - Tdap due 2027  - PCV/PPSV: last 02/2024  - Shingrix: series complete     Family History:   - Brother passed away from PE, CLL   - Sister with Leiomyosarcoma (s/p hysterectomy); sister with asthma   - Mother with AML (age 40)  - Father with CVA (age 57)  - Son with GERD; VATS/Decortication s/p PNA     Family History   Problem Relation Name Age of Onset    Leukemia Mother      Leukemia Brother      Blood clot Brother      Heart attack Brother      Diabetes Mother's Sister      Other (CVA) Mother's Sister      Hyperlipidemia Father's Sister      Heart disease Father's Sister       Social History:   Home/Living Situation/Falls/Safety Assessment: lives at home alone; feels safe at home   Education/Employment/Work/Vocational: retired from    Activities: volunteering prior to the pandemic; avid reader; walking   Drug Use: prior smoker (50 years; up to pack a day); quit 10+ years ago   Diet: no dietary concerns   Depression/Anxiety: on SSRI   Sexuality/Contraception/Menstrual History:   Sleep: deep sleeper; feels she sleeps too much     Patient Information:  Health Insurance: has insurance   Transportation: drives  Healthcare POA/Guardian: Sister Michael) [phone]  Contact Information: [phone]    Visit Vitals  /64   Pulse 77   Ht 1.59 m (5' 2.6\")   Wt 70.2 kg (154 lb 12.8 oz)   SpO2 94%   BMI 27.77 kg/m²   OB Status Postmenopausal   Smoking Status Former   BSA 1.76 m²      PHYSICAL EXAM:   General:  female in NAD, well appearing   HEENT: NCAT, MMM  CV: RRR in office today   PULM: CTAB with diminished sounds at L base in setting of prior LLL lobectomy   ABD: soft, NT, ND, no rebound/guarding  : no suprapubic or CVA tenderness  EXT: WWP, no edema  SKIN: no rashes noted  NEURO: " A&Ox3, no gross motor or sensory deficits, ambulates without assistance  PSYCH: pleasant mood, appropriate affect     Assessment/Plan    Brittany Suarez is a 83 y.o. female seen in Clinic at [facility] by Dr. Avery Hare on 07/09/24 for routine care, as well as for management of the following chronic medical conditions: DLD, Tachy-Alan s/p PPM, HypoT, prior lung adenocarcinoma (diagnosed 2018, follows with pulDr. Barbara rodríguez), low back pain. She presents today for CPE/MCW visit.       CHRONIC MEDICAL CONDITIONS:  #DLD  - Atorva 40  - LDL 84 per labs 07/2024    #Tachy-Alan Syndrome s/p pacemaker, PAFib  - AC   - Metoprolol 50+25mg   - Xarelto 20mg QHS     #HypoT  - Levothyroxine 75mcg dosing currently   - TSH WNL 07/2024    #Depression   - Paroxetine 20mg daily     #HTN  - BP historically on lower side  - Metoprolol only medication as above     #Lung Cancer  - Lung adeno; diagnosed 07/2018 s/p LLL lobectomy in 09/2018  - Follows with PulDr. Barbara rodríguez; last imaging in January 2023 reassuring  - last seen 01/2024 with plans for ongoing surveillance   - Prior smoker   [  ] next imaging due 01/2025    #Anemia  - Iron supplementation 4x/week  - improved iron indices with persistent microcytosis; possibility of alpha thal   - Repeat CBC and iron studies overall stable, last in 10/2023   [x] continue to trend every ~6 months: repeat labs today with Hgb 11.5 (from 11.2)    #Osteoporosis   - patient defers treatment at this time  - continue to f/u; consider Rheum referral, has previously deferred bisphosphonates  - Vitamin D and Calcium supplementation recommended     #Low Back Pain, recurrent   - recurrent low dose muscle relaxant trial   - continue PT exercises   - plain films (lung adeno history, no concerning lytic lesions) from 06/2023 showed moderate facet arthropathy at L4-L5 and L5-S1, mild spondylosis, Grade 1 L4-L5 anterolisthesis  - per patient, has been told pacemaker is NOT MRI compatible    #Chronic Sinusitis   -  "chronic issue for \"decades\" per patient  - Ipratropium nasal spray  - discussed intranasal corticosteroids as adjunct  - prior ENT referral     #Health Maintenance    Cancer Screening  - Cervical Cancer Screening: last pap smear/HPV testing: further screening not recommended given age    - Mammography: last 02/2024   - Colorectal Cancer Screening: defers; last 2020   - Lung Cancer: prior history, follows with pulm annually     Laboratory Screening  - Lipid Screen: on statin, labs today reassuring   - A1C, glucose screen: non-obese; reassuring CMP 07/2024   - STI, HIV, Hep B screen: defers  - Hep C screen: defers    Imaging Screening  - Osteoporosis/DEXA screening: Jan 2023, patient defers treatment at this time     Immunizations:   - Influenza: 2023 UTD   - COVID: fall 2023 UTD  - RSV: recommended   - Tdap: due 2027  - Prevnar, Pneumovax: UTD  - Shingrix: UTD     Other Screening  - Health Literacy Assessment: Appropriate   - Depression screen: NEGATIVE   - Home safety/partner violence screen: NEGATIVE   - Alcohol/tobacco/drug use screen: prior smoker   - Healthcare POA/Advanced Directives: Sister     Referrals: labs     Patient Discussion:    Please call back the office with any questions at [phone]. In the case of an emergency, please call 911 or go to the nearest Emergency Department.     Avery Hare MD  Internal Medicine-Pediatrics  [facility] [address], [address]  P: [phone], F: [phone]   "

## 2024-07-09 NOTE — PATIENT INSTRUCTIONS
Aquaphor Ointment  Updated labs -- Labs in [address] today     Mental Health Resources:   [email or web address]     [email or web address]     [email or web address]    Updated Flu, COVID, and RSV vaccines this fall     Very nice to see you today!    Dr. Hare

## 2024-07-22 ASSESSMENT — PATIENT HEALTH QUESTIONNAIRE - PHQ9
2. FEELING DOWN, DEPRESSED OR HOPELESS: NOT AT ALL
1. LITTLE INTEREST OR PLEASURE IN DOING THINGS: NOT AT ALL
SUM OF ALL RESPONSES TO PHQ9 QUESTIONS 1 AND 2: 0

## 2024-07-22 ASSESSMENT — ACTIVITIES OF DAILY LIVING (ADL)
TAKING_MEDICATION: INDEPENDENT
GROCERY_SHOPPING: INDEPENDENT
DOING_HOUSEWORK: INDEPENDENT
MANAGING_FINANCES: INDEPENDENT
BATHING: INDEPENDENT
DRESSING: INDEPENDENT

## 2024-07-22 ASSESSMENT — ENCOUNTER SYMPTOMS
OCCASIONAL FEELINGS OF UNSTEADINESS: 0
DEPRESSION: 0
LOSS OF SENSATION IN FEET: 0

## 2024-08-23 ENCOUNTER — TELEPHONE (OUTPATIENT)
Dept: OBSTETRICS AND GYNECOLOGY | Facility: CLINIC | Age: 84
End: 2024-08-23
Payer: MEDICARE

## 2024-08-23 NOTE — TELEPHONE ENCOUNTER
Pt returned call.  Pt verified by name and .  Pt is aware of Yosvany Ying's message:  Pt may use Phytoplex cream per Yosvany Ying  Pt has no questions at this time.          Left message for pt to return call.  Pt may use Phytoplex cream per Yosvany Ying

## 2024-09-23 ENCOUNTER — HOSPITAL ENCOUNTER (OUTPATIENT)
Dept: CARDIOLOGY | Facility: CLINIC | Age: 84
Discharge: HOME | End: 2024-09-23
Payer: MEDICARE

## 2024-09-23 DIAGNOSIS — I44.2 COMPLETE ATRIOVENTRICULAR BLOCK (MULTI): ICD-10-CM

## 2024-09-23 DIAGNOSIS — Z95.0 PRESENCE OF CARDIAC PACEMAKER: ICD-10-CM

## 2024-09-23 PROCEDURE — 93296 REM INTERROG EVL PM/IDS: CPT

## 2024-09-25 DIAGNOSIS — I48.0 PAROXYSMAL ATRIAL FIBRILLATION (MULTI): ICD-10-CM

## 2024-09-30 DIAGNOSIS — I48.0 PAROXYSMAL ATRIAL FIBRILLATION (MULTI): Primary | ICD-10-CM

## 2024-09-30 RX ORDER — METOPROLOL SUCCINATE 50 MG/1
50 TABLET, EXTENDED RELEASE ORAL DAILY
Qty: 90 TABLET | Refills: 3 | Status: SHIPPED | OUTPATIENT
Start: 2024-09-30 | End: 2025-09-30

## 2024-10-08 ENCOUNTER — TELEPHONE (OUTPATIENT)
Dept: SCHEDULING | Age: 84
End: 2024-10-08
Payer: MEDICARE

## 2024-10-08 NOTE — TELEPHONE ENCOUNTER
Called and spoke to pharmacist at [facility].  Refill request for Metoprolol Succinate was in error.  Patient takes both a 25 mg tablet and 50 mg tablet together and they were dispensed on 9/3/24 and 10/2/24 and there are refills remaining.

## 2024-10-21 ENCOUNTER — HOSPITAL ENCOUNTER (OUTPATIENT)
Dept: CARDIOLOGY | Facility: CLINIC | Age: 84
Discharge: HOME | End: 2024-10-21
Payer: MEDICARE

## 2024-10-21 DIAGNOSIS — Z95.0 PRESENCE OF CARDIAC PACEMAKER: ICD-10-CM

## 2024-10-21 DIAGNOSIS — I44.2 COMPLETE ATRIOVENTRICULAR BLOCK (MULTI): ICD-10-CM

## 2024-11-20 DIAGNOSIS — C34.32 MALIGNANT NEOPLASM OF LOWER LOBE OF LEFT LUNG (MULTI): ICD-10-CM

## 2024-11-20 DIAGNOSIS — F32.A DEPRESSION, UNSPECIFIED DEPRESSION TYPE: ICD-10-CM

## 2024-11-20 RX ORDER — PAROXETINE HYDROCHLORIDE 20 MG/1
20 TABLET, FILM COATED ORAL DAILY
Qty: 90 TABLET | Refills: 3 | Status: SHIPPED | OUTPATIENT
Start: 2024-11-20

## 2024-11-20 RX ORDER — IPRATROPIUM BROMIDE 42 UG/1
2 SPRAY, METERED NASAL 4 TIMES DAILY
Qty: 15 ML | Refills: 12 | Status: SHIPPED | OUTPATIENT
Start: 2024-11-20

## 2024-11-25 DIAGNOSIS — C34.32 MALIGNANT NEOPLASM OF LOWER LOBE OF LEFT LUNG (MULTI): ICD-10-CM

## 2024-11-25 RX ORDER — IPRATROPIUM BROMIDE 42 UG/1
2 SPRAY, METERED NASAL 4 TIMES DAILY
Qty: 15 ML | Refills: 12 | Status: SHIPPED | OUTPATIENT
Start: 2024-11-25 | End: 2024-11-25 | Stop reason: SDUPTHER

## 2024-11-25 RX ORDER — IPRATROPIUM BROMIDE 42 UG/1
2 SPRAY, METERED NASAL 4 TIMES DAILY
Qty: 15 ML | Refills: 12 | Status: SHIPPED | OUTPATIENT
Start: 2024-11-25

## 2024-12-13 ENCOUNTER — APPOINTMENT (OUTPATIENT)
Dept: OBSTETRICS AND GYNECOLOGY | Facility: CLINIC | Age: 84
End: 2024-12-13
Payer: MEDICARE

## 2024-12-13 VITALS
WEIGHT: 152.4 LBS | DIASTOLIC BLOOD PRESSURE: 70 MMHG | HEIGHT: 62 IN | SYSTOLIC BLOOD PRESSURE: 110 MMHG | BODY MASS INDEX: 28.05 KG/M2

## 2024-12-13 DIAGNOSIS — L90.0 LICHEN SCLEROSUS: Primary | ICD-10-CM

## 2024-12-13 PROCEDURE — 1126F AMNT PAIN NOTED NONE PRSNT: CPT | Performed by: NURSE PRACTITIONER

## 2024-12-13 PROCEDURE — 3078F DIAST BP <80 MM HG: CPT | Performed by: NURSE PRACTITIONER

## 2024-12-13 PROCEDURE — 3074F SYST BP LT 130 MM HG: CPT | Performed by: NURSE PRACTITIONER

## 2024-12-13 PROCEDURE — 1036F TOBACCO NON-USER: CPT | Performed by: NURSE PRACTITIONER

## 2024-12-13 PROCEDURE — 1159F MED LIST DOCD IN RCRD: CPT | Performed by: NURSE PRACTITIONER

## 2024-12-13 PROCEDURE — 1157F ADVNC CARE PLAN IN RCRD: CPT | Performed by: NURSE PRACTITIONER

## 2024-12-13 PROCEDURE — 99213 OFFICE O/P EST LOW 20 MIN: CPT | Performed by: NURSE PRACTITIONER

## 2024-12-13 PROCEDURE — 1123F ACP DISCUSS/DSCN MKR DOCD: CPT | Performed by: NURSE PRACTITIONER

## 2024-12-13 ASSESSMENT — ENCOUNTER SYMPTOMS
ALLERGIC/IMMUNOLOGIC NEGATIVE: 0
CONSTITUTIONAL NEGATIVE: 0
EYES NEGATIVE: 0
GASTROINTESTINAL NEGATIVE: 0
NEUROLOGICAL NEGATIVE: 0
HEMATOLOGIC/LYMPHATIC NEGATIVE: 0
MUSCULOSKELETAL NEGATIVE: 0
CARDIOVASCULAR NEGATIVE: 0
RESPIRATORY NEGATIVE: 0
ENDOCRINE NEGATIVE: 0
PSYCHIATRIC NEGATIVE: 0

## 2024-12-13 ASSESSMENT — PAIN SCALES - GENERAL: PAINLEVEL_OUTOF10: 0-NO PAIN

## 2024-12-13 NOTE — PROGRESS NOTES
Subjective   Patient ID: Brittany Suarez is a 84 y.o. female who presents for Lichen Sclerosus.  HPI  6 month follow up   Very infrequent itch on the left labia   1-2 times per week applies clobetasol   Review of Systems    Objective   Physical Exam  Genitourinary:     Comments: 100% reabsorption of labia minora; clitoral glans not visible   Tissue pale but no lesions and doesn't appear dry  Vaginal narrowing noted but appears similar to last exam        Assessment/Plan   Diagnoses and all orders for this visit:  Lichen sclerosus       Pt encouraged to apply clobetasol twice weekly   Instead of Q6 months she prefers to come in annually for exam; she is aware that she will come in sooner if she has any itching, irritation or pain    JIMMY Hanson-CNP 12/13/24 3:24 PM